# Patient Record
Sex: MALE | Race: WHITE
[De-identification: names, ages, dates, MRNs, and addresses within clinical notes are randomized per-mention and may not be internally consistent; named-entity substitution may affect disease eponyms.]

---

## 2021-01-15 ENCOUNTER — NURSE TRIAGE (OUTPATIENT)
Dept: OTHER | Facility: CLINIC | Age: 81
End: 2021-01-15

## 2021-01-15 NOTE — TELEPHONE ENCOUNTER
Reason for Disposition   [1] CLOSE CONTACT COVID-19 EXPOSURE within last 14 days AND [2] NO symptoms    Answer Assessment - Initial Assessment Questions  1. COVID-19 CLOSE CONTACT: \"Who is the person with the confirmed or suspected COVID-19 infection that you were exposed to? \"      He was exposed to his wife who tested positive on 1/14. 2. PLACE of CONTACT: \"Where were you when you were exposed to COVID-19? \" (e.g., home, school, medical waiting room; which city?)      In the home     3. TYPE of CONTACT: \"How much contact was there? \" (e.g., sitting next to, live in same house, work in same office, same building)     Lives in the same home    4. DURATION of CONTACT: \"How long were you in contact with the COVID-19 patient? \" (e.g., a few seconds, passed by person, a few minutes, 15 minutes or longer, live with the patient)     Lives with the patient     5. MASK: \"Were you wearing a mask? \" \"Was the other person wearing a mask? \" Note: wearing a mask reduces the risk of an   otherwise close contact. No     6. DATE of CONTACT: \"When did you have contact with a COVID-19 patient? \" (e.g., how many days ago)     Every day     7. COMMUNITY SPREAD: \"Are there lots of cases of COVID-19 (community spread) where you live? \" (See public health department website, if unsure)         8. SYMPTOMS: \"Do you have any symptoms? \" (e.g., fever, cough, breathing difficulty, loss of taste or smell)      No symptoms     9. PREGNANCY OR POSTPARTUM: \"Is there any chance you are pregnant? \" \"When was your last menstrual period? \" \"Did you deliver in the last 2 weeks? \"          10. HIGH RISK: \"Do you have any heart or lung problems? Do you have a weak immune system? \" (e.g., heart failure, COPD, asthma, HIV positive, chemotherapy, renal failure, diabetes mellitus, sickle cell anemia, obesity)        No high risk     11. TRAVEL: \"Have you traveled out of the country recently? \" If so, \"When and where? \"  Also ask about out-of-state travel, since the CDC has identified some high-risk cities for community spread in the [address]. Note: Travel becomes less relevant if there is widespread community transmission where the patient lives. Protocols used: CORONAVIRUS (COVID-19) EXPOSURE-ADULT-AH    POD 5     Brief description of triage: Exposure to covid and no symptoms. Wife is caller and asking about if he should be tested with having no symptoms. He is currently out walking. Triage indicates for patient to call PCP to discuss testing. Care advice provided, wife verbalizes understanding; denies any other questions or concerns; instructed to call back for any new or worsening symptoms. This triage is a result of a call to [address] [place] [address]. Please do not respond to the triage nurse through this encounter. Any subsequent communication should be directly with the patient.

## 2023-09-01 ENCOUNTER — HOSPITAL ENCOUNTER (OUTPATIENT)
Dept: DATA CONVERSION | Facility: HOSPITAL | Age: 83
Discharge: HOME | End: 2023-09-01
Payer: MEDICARE

## 2023-09-01 DIAGNOSIS — R97.20 ELEVATED PROSTATE SPECIFIC ANTIGEN (PSA): ICD-10-CM

## 2023-09-01 DIAGNOSIS — I48.0 PAROXYSMAL ATRIAL FIBRILLATION (MULTI): ICD-10-CM

## 2023-09-01 DIAGNOSIS — E78.00 PURE HYPERCHOLESTEROLEMIA, UNSPECIFIED: ICD-10-CM

## 2023-09-01 DIAGNOSIS — I25.10 ATHEROSCLEROTIC HEART DISEASE OF NATIVE CORONARY ARTERY WITHOUT ANGINA PECTORIS: ICD-10-CM

## 2023-09-01 DIAGNOSIS — I10 ESSENTIAL (PRIMARY) HYPERTENSION: ICD-10-CM

## 2023-09-01 LAB
ALBUMIN SERPL-MCNC: 4 GM/DL (ref 3.5–5)
ALBUMIN/GLOB SERPL: 1.6 RATIO (ref 1.5–3)
ALP BLD-CCNC: 45 U/L (ref 35–125)
ALT SERPL-CCNC: 23 U/L (ref 5–40)
ANION GAP SERPL CALCULATED.3IONS-SCNC: 13 MMOL/L (ref 0–19)
APPEARANCE PLAS: CLEAR
AST SERPL-CCNC: 25 U/L (ref 5–40)
BASOPHILS # BLD AUTO: 0.05 K/UL (ref 0–0.22)
BASOPHILS NFR BLD AUTO: 0.8 % (ref 0–1)
BILIRUB SERPL-MCNC: 0.8 MG/DL (ref 0.1–1.2)
BUN SERPL-MCNC: 22 MG/DL (ref 8–25)
BUN/CREAT SERPL: 24.4 RATIO (ref 8–21)
CALCIUM SERPL-MCNC: 8.9 MG/DL (ref 8.5–10.4)
CHLORIDE SERPL-SCNC: 101 MMOL/L (ref 97–107)
CHOLEST SERPL-MCNC: 121 MG/DL (ref 133–200)
CHOLEST/HDLC SERPL: 2.9 RATIO
CO2 SERPL-SCNC: 22 MMOL/L (ref 24–31)
COLOR SPUN FLD: YELLOW
CREAT SERPL-MCNC: 0.9 MG/DL (ref 0.4–1.6)
DEPRECATED RDW RBC AUTO: 44.6 FL (ref 37–54)
DIFFERENTIAL METHOD BLD: ABNORMAL
EOSINOPHIL # BLD AUTO: 0.16 K/UL (ref 0–0.45)
EOSINOPHIL NFR BLD: 2.7 % (ref 0–3)
ERYTHROCYTE [DISTWIDTH] IN BLOOD BY AUTOMATED COUNT: 13.2 % (ref 11.7–15)
FASTING STATUS PATIENT QL REPORTED: ABNORMAL
GFR SERPL CREATININE-BSD FRML MDRD: 86 ML/MIN/1.73 M2
GLOBULIN SER-MCNC: 2.5 G/DL (ref 1.9–3.7)
GLUCOSE SERPL-MCNC: 192 MG/DL (ref 65–99)
HCT VFR BLD AUTO: 45.7 % (ref 41–50)
HDLC SERPL-MCNC: 42 MG/DL
HGB BLD-MCNC: 15.8 GM/DL (ref 13.5–16.5)
IMM GRANULOCYTES # BLD AUTO: 0.01 K/UL (ref 0–0.1)
LDLC SERPL CALC-MCNC: 49 MG/DL (ref 65–130)
LYMPHOCYTES # BLD AUTO: 2.38 K/UL (ref 1.2–3.2)
LYMPHOCYTES NFR BLD MANUAL: 40.1 % (ref 20–40)
MCH RBC QN AUTO: 31.8 PG (ref 26–34)
MCHC RBC AUTO-ENTMCNC: 34.6 % (ref 31–37)
MCV RBC AUTO: 92 FL (ref 80–100)
MONOCYTES # BLD AUTO: 0.43 K/UL (ref 0–0.8)
MONOCYTES NFR BLD MANUAL: 7.3 % (ref 0–8)
NEUTROPHILS # BLD AUTO: 2.9 K/UL
NEUTROPHILS # BLD AUTO: 2.9 K/UL (ref 1.8–7.7)
NEUTROPHILS.IMMATURE NFR BLD: 0.2 % (ref 0–1)
NEUTS SEG NFR BLD: 48.9 % (ref 50–70)
NRBC BLD-RTO: 0 /100 WBC
PLATELET # BLD AUTO: 144 K/UL (ref 150–450)
PMV BLD AUTO: 10 CU (ref 7–12.6)
POTASSIUM SERPL-SCNC: 4 MMOL/L (ref 3.4–5.1)
PROT SERPL-MCNC: 6.5 G/DL (ref 5.9–7.9)
PSA SERPL-MCNC: 4.4 NG/ML (ref 0–4.1)
RBC # BLD AUTO: 4.97 M/UL (ref 4.5–5.5)
SODIUM SERPL-SCNC: 136 MMOL/L (ref 133–145)
TRIGL SERPL-MCNC: 152 MG/DL (ref 40–150)
TSH SERPL DL<=0.05 MIU/L-ACNC: 2.91 MIU/L (ref 0.27–4.2)
WBC # BLD AUTO: 5.9 K/UL (ref 4.5–11)

## 2023-10-03 DIAGNOSIS — N32.81 OVERACTIVE BLADDER: Primary | ICD-10-CM

## 2023-10-03 RX ORDER — OXYBUTYNIN CHLORIDE 5 MG/1
5 TABLET ORAL 2 TIMES DAILY
Qty: 180 TABLET | Refills: 3 | Status: SHIPPED | OUTPATIENT
Start: 2023-10-03 | End: 2023-12-29 | Stop reason: HOSPADM

## 2023-10-03 NOTE — TELEPHONE ENCOUNTER
Requested Prescriptions     Pending Prescriptions Disp Refills    oxybutynin (Ditropan) 5 mg tablet [Pharmacy Med Name: Oxybutynin Chloride 5 MG Oral Tablet] 60 tablet 0     Sig: Take 1 tablet (5 mg) by mouth 2 times a day.

## 2023-12-25 ENCOUNTER — HOSPITAL ENCOUNTER (INPATIENT)
Facility: HOSPITAL | Age: 83
LOS: 4 days | Discharge: NURSING, REHAB OR LONG-TERM CARE | DRG: 185 | End: 2023-12-29
Attending: STUDENT IN AN ORGANIZED HEALTH CARE EDUCATION/TRAINING PROGRAM | Admitting: INTERNAL MEDICINE
Payer: MEDICARE

## 2023-12-25 ENCOUNTER — APPOINTMENT (OUTPATIENT)
Dept: RADIOLOGY | Facility: HOSPITAL | Age: 83
DRG: 185 | End: 2023-12-25
Payer: MEDICARE

## 2023-12-25 DIAGNOSIS — S22.41XA CLOSED FRACTURE OF MULTIPLE RIBS OF RIGHT SIDE, INITIAL ENCOUNTER: Primary | ICD-10-CM

## 2023-12-25 DIAGNOSIS — S22.49XA MULTIPLE RIB FRACTURES INVOLVING FIRST RIB: ICD-10-CM
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
## 2023-12-25 LAB
ALBUMIN SERPL-MCNC: 4.1 G/DL (ref 3.5–5)
ALP BLD-CCNC: 46 U/L (ref 35–125)
ALT SERPL-CCNC: 25 U/L (ref 5–40)
ANION GAP SERPL CALC-SCNC: 10 MMOL/L
AST SERPL-CCNC: 24 U/L (ref 5–40)
BASOPHILS # BLD AUTO: 0.03 X10*3/UL (ref 0–0.1)
BASOPHILS NFR BLD AUTO: 0.3 %
BILIRUB SERPL-MCNC: 0.7 MG/DL (ref 0.1–1.2)
BUN SERPL-MCNC: 28 MG/DL (ref 8–25)
CALCIUM SERPL-MCNC: 8.9 MG/DL (ref 8.5–10.4)
CHLORIDE SERPL-SCNC: 105 MMOL/L (ref 97–107)
CO2 SERPL-SCNC: 23 MMOL/L (ref 24–31)
CREAT SERPL-MCNC: 0.8 MG/DL (ref 0.4–1.6)
EOSINOPHIL # BLD AUTO: 0.02 X10*3/UL (ref 0–0.4)
EOSINOPHIL NFR BLD AUTO: 0.2 %
ERYTHROCYTE [DISTWIDTH] IN BLOOD BY AUTOMATED COUNT: 12.5 % (ref 11.5–14.5)
GFR SERPL CREATININE-BSD FRML MDRD: 88 ML/MIN/1.73M*2
GLUCOSE SERPL-MCNC: 168 MG/DL (ref 65–99)
HCT VFR BLD AUTO: 42.9 % (ref 41–52)
HGB BLD-MCNC: 14.9 G/DL (ref 13.5–17.5)
IMM GRANULOCYTES # BLD AUTO: 0.03 X10*3/UL (ref 0–0.5)
IMM GRANULOCYTES NFR BLD AUTO: 0.3 % (ref 0–0.9)
LYMPHOCYTES # BLD AUTO: 1.31 X10*3/UL (ref 0.8–3)
LYMPHOCYTES NFR BLD AUTO: 12.3 %
MCH RBC QN AUTO: 31.7 PG (ref 26–34)
MCHC RBC AUTO-ENTMCNC: 34.7 G/DL (ref 32–36)
MCV RBC AUTO: 91 FL (ref 80–100)
MONOCYTES # BLD AUTO: 0.48 X10*3/UL (ref 0.05–0.8)
MONOCYTES NFR BLD AUTO: 4.5 %
NEUTROPHILS # BLD AUTO: 8.78 X10*3/UL (ref 1.6–5.5)
NEUTROPHILS NFR BLD AUTO: 82.4 %
NRBC BLD-RTO: 0 /100 WBCS (ref 0–0)
PLATELET # BLD AUTO: 140 X10*3/UL (ref 150–450)
POTASSIUM SERPL-SCNC: 4 MMOL/L (ref 3.4–5.1)
PROT SERPL-MCNC: 6.7 G/DL (ref 5.9–7.9)
RBC # BLD AUTO: 4.7 X10*6/UL (ref 4.5–5.9)
SODIUM SERPL-SCNC: 138 MMOL/L (ref 133–145)
WBC # BLD AUTO: 10.7 X10*3/UL (ref 4.4–11.3)

## 2023-12-25 PROCEDURE — 2500000004 HC RX 250 GENERAL PHARMACY W/ HCPCS (ALT 636 FOR OP/ED): Performed by: STUDENT IN AN ORGANIZED HEALTH CARE EDUCATION/TRAINING PROGRAM

## 2023-12-25 PROCEDURE — 36415 COLL VENOUS BLD VENIPUNCTURE: CPT | Performed by: STUDENT IN AN ORGANIZED HEALTH CARE EDUCATION/TRAINING PROGRAM

## 2023-12-25 PROCEDURE — 85025 COMPLETE CBC W/AUTO DIFF WBC: CPT | Performed by: STUDENT IN AN ORGANIZED HEALTH CARE EDUCATION/TRAINING PROGRAM

## 2023-12-25 PROCEDURE — 2500000001 HC RX 250 WO HCPCS SELF ADMINISTERED DRUGS (ALT 637 FOR MEDICARE OP): Performed by: INTERNAL MEDICINE

## 2023-12-25 PROCEDURE — 71250 CT THORAX DX C-: CPT

## 2023-12-25 PROCEDURE — 2500000001 HC RX 250 WO HCPCS SELF ADMINISTERED DRUGS (ALT 637 FOR MEDICARE OP): Performed by: NURSE PRACTITIONER

## 2023-12-25 PROCEDURE — 2500000004 HC RX 250 GENERAL PHARMACY W/ HCPCS (ALT 636 FOR OP/ED): Performed by: INTERNAL MEDICINE

## 2023-12-25 PROCEDURE — 99285 EMERGENCY DEPT VISIT HI MDM: CPT | Performed by: STUDENT IN AN ORGANIZED HEALTH CARE EDUCATION/TRAINING PROGRAM

## 2023-12-25 PROCEDURE — 99223 1ST HOSP IP/OBS HIGH 75: CPT | Performed by: SURGERY

## 2023-12-25 PROCEDURE — 80053 COMPREHEN METABOLIC PANEL: CPT | Performed by: STUDENT IN AN ORGANIZED HEALTH CARE EDUCATION/TRAINING PROGRAM

## 2023-12-25 PROCEDURE — 97162 PT EVAL MOD COMPLEX 30 MIN: CPT | Mod: GP | Performed by: PHYSICAL THERAPIST

## 2023-12-25 PROCEDURE — 97530 THERAPEUTIC ACTIVITIES: CPT | Mod: GP | Performed by: PHYSICAL THERAPIST

## 2023-12-25 PROCEDURE — 1100000001 HC PRIVATE ROOM DAILY

## 2023-12-25 RX ORDER — AMLODIPINE BESYLATE 2.5 MG/1
2.5 TABLET ORAL EVERY 24 HOURS
COMMUNITY
Start: 2023-03-14

## 2023-12-25 RX ORDER — ROSUVASTATIN CALCIUM 10 MG/1
10 TABLET, FILM COATED ORAL EVERY 24 HOURS
COMMUNITY
Start: 2022-06-28

## 2023-12-25 RX ORDER — LOSARTAN POTASSIUM 100 MG/1
100 TABLET ORAL EVERY 24 HOURS
Status: DISCONTINUED | OUTPATIENT
Start: 2023-12-25 | End: 2023-12-29 | Stop reason: HOSPADM

## 2023-12-25 RX ORDER — OXYCODONE AND ACETAMINOPHEN 5; 325 MG/1; MG/1
1 TABLET ORAL EVERY 6 HOURS PRN
Status: DISCONTINUED | OUTPATIENT
Start: 2023-12-25 | End: 2023-12-29 | Stop reason: HOSPADM

## 2023-12-25 RX ORDER — POLYETHYLENE GLYCOL 3350 17 G/17G
17 POWDER, FOR SOLUTION ORAL DAILY
Status: DISCONTINUED | OUTPATIENT
Start: 2023-12-25 | End: 2023-12-29 | Stop reason: HOSPADM

## 2023-12-25 RX ORDER — METOPROLOL SUCCINATE 25 MG/1
25 TABLET, EXTENDED RELEASE ORAL DAILY
COMMUNITY
Start: 2023-08-30

## 2023-12-25 RX ORDER — ROSUVASTATIN CALCIUM 10 MG/1
10 TABLET, COATED ORAL NIGHTLY
Status: DISCONTINUED | OUTPATIENT
Start: 2023-12-25 | End: 2023-12-29 | Stop reason: HOSPADM

## 2023-12-25 RX ORDER — BACLOFEN 10 MG/1
10 TABLET ORAL 3 TIMES DAILY
Status: DISCONTINUED | OUTPATIENT
Start: 2023-12-25 | End: 2023-12-29 | Stop reason: HOSPADM

## 2023-12-25 RX ORDER — LOSARTAN POTASSIUM 100 MG/1
100 TABLET ORAL EVERY 24 HOURS
COMMUNITY
Start: 2022-12-12

## 2023-12-25 RX ORDER — SILDENAFIL CITRATE 20 MG/1
20 TABLET ORAL AS NEEDED
COMMUNITY
Start: 2015-10-23 | End: 2023-12-29 | Stop reason: HOSPADM

## 2023-12-25 RX ORDER — OXYCODONE AND ACETAMINOPHEN 5; 325 MG/1; MG/1
2 TABLET ORAL EVERY 6 HOURS PRN
Status: DISCONTINUED | OUTPATIENT
Start: 2023-12-25 | End: 2023-12-25

## 2023-12-25 RX ORDER — MORPHINE SULFATE 4 MG/ML
4 INJECTION, SOLUTION INTRAMUSCULAR; INTRAVENOUS ONCE
Status: COMPLETED | OUTPATIENT
Start: 2023-12-25 | End: 2023-12-25

## 2023-12-25 RX ORDER — OXYCODONE AND ACETAMINOPHEN 5; 325 MG/1; MG/1
2 TABLET ORAL EVERY 4 HOURS PRN
Status: DISCONTINUED | OUTPATIENT
Start: 2023-12-25 | End: 2023-12-29 | Stop reason: HOSPADM

## 2023-12-25 RX ORDER — PETROLATUM,WHITE/LANOLIN
1000 OINTMENT (GRAM) TOPICAL EVERY 24 HOURS
COMMUNITY
End: 2023-12-29 | Stop reason: HOSPADM

## 2023-12-25 RX ORDER — ASPIRIN 81 MG/1
81 TABLET ORAL DAILY
COMMUNITY

## 2023-12-25 RX ORDER — APIXABAN 5 MG/1
5 TABLET, FILM COATED ORAL EVERY 12 HOURS
COMMUNITY

## 2023-12-25 RX ORDER — METOPROLOL SUCCINATE 25 MG/1
25 TABLET, EXTENDED RELEASE ORAL DAILY
Status: DISCONTINUED | OUTPATIENT
Start: 2023-12-25 | End: 2023-12-29 | Stop reason: HOSPADM

## 2023-12-25 RX ORDER — ASPIRIN 81 MG/1
81 TABLET ORAL DAILY
Status: DISCONTINUED | OUTPATIENT
Start: 2023-12-25 | End: 2023-12-29 | Stop reason: HOSPADM

## 2023-12-25 RX ORDER — AMLODIPINE BESYLATE 2.5 MG/1
2.5 TABLET ORAL EVERY 24 HOURS
Status: DISCONTINUED | OUTPATIENT
Start: 2023-12-25 | End: 2023-12-29 | Stop reason: HOSPADM

## 2023-12-25 RX ORDER — OXYCODONE HCL 10 MG/1
10 TABLET, FILM COATED, EXTENDED RELEASE ORAL EVERY 12 HOURS SCHEDULED
Status: DISCONTINUED | OUTPATIENT
Start: 2023-12-25 | End: 2023-12-29 | Stop reason: HOSPADM

## 2023-12-25 RX ADMIN — OXYCODONE AND ACETAMINOPHEN 2 TABLET: 5; 325 TABLET ORAL at 14:01

## 2023-12-25 RX ADMIN — APIXABAN 5 MG: 5 TABLET, FILM COATED ORAL at 20:17

## 2023-12-25 RX ADMIN — POLYETHYLENE GLYCOL 3350 17 G: 17 POWDER, FOR SOLUTION ORAL at 09:43

## 2023-12-25 RX ADMIN — OXYCODONE AND ACETAMINOPHEN 2 TABLET: 5; 325 TABLET ORAL at 18:44

## 2023-12-25 RX ADMIN — METOPROLOL SUCCINATE 25 MG: 25 TABLET, EXTENDED RELEASE ORAL at 09:42

## 2023-12-25 RX ADMIN — ASPIRIN 81 MG: 81 TABLET, COATED ORAL at 09:43

## 2023-12-25 RX ADMIN — OXYCODONE AND ACETAMINOPHEN 2 TABLET: 5; 325 TABLET ORAL at 05:44

## 2023-12-25 RX ADMIN — OXYCODONE HYDROCHLORIDE 10 MG: 10 TABLET, FILM COATED, EXTENDED RELEASE ORAL at 20:17

## 2023-12-25 RX ADMIN — MORPHINE SULFATE 4 MG: 4 INJECTION, SOLUTION INTRAMUSCULAR; INTRAVENOUS at 02:38

## 2023-12-25 RX ADMIN — ROSUVASTATIN CALCIUM 10 MG: 10 TABLET, FILM COATED ORAL at 20:17

## 2023-12-25 RX ADMIN — OXYCODONE HYDROCHLORIDE 10 MG: 10 TABLET, FILM COATED, EXTENDED RELEASE ORAL at 09:43

## 2023-12-25 RX ADMIN — BACLOFEN 10 MG: 10 TABLET ORAL at 20:17

## 2023-12-25 RX ADMIN — APIXABAN 5 MG: 5 TABLET, FILM COATED ORAL at 09:42

## 2023-12-25 SDOH — SOCIAL STABILITY: SOCIAL INSECURITY: DOES ANYONE TRY TO KEEP YOU FROM HAVING/CONTACTING OTHER FRIENDS OR DOING THINGS OUTSIDE YOUR HOME?: NO

## 2023-12-25 SDOH — SOCIAL STABILITY: SOCIAL INSECURITY: ARE YOU OR HAVE YOU BEEN THREATENED OR ABUSED PHYSICALLY, EMOTIONALLY, OR SEXUALLY BY ANYONE?: NO

## 2023-12-25 SDOH — SOCIAL STABILITY: SOCIAL INSECURITY: ABUSE: ADULT

## 2023-12-25 SDOH — SOCIAL STABILITY: SOCIAL INSECURITY: DO YOU FEEL ANYONE HAS EXPLOITED OR TAKEN ADVANTAGE OF YOU FINANCIALLY OR OF YOUR PERSONAL PROPERTY?: NO

## 2023-12-25 SDOH — SOCIAL STABILITY: SOCIAL INSECURITY: WERE YOU ABLE TO COMPLETE ALL THE BEHAVIORAL HEALTH SCREENINGS?: YES

## 2023-12-25 SDOH — SOCIAL STABILITY: SOCIAL INSECURITY: DO YOU FEEL UNSAFE GOING BACK TO THE PLACE WHERE YOU ARE LIVING?: NO

## 2023-12-25 SDOH — SOCIAL STABILITY: SOCIAL INSECURITY: ARE THERE ANY APPARENT SIGNS OF INJURIES/BEHAVIORS THAT COULD BE RELATED TO ABUSE/NEGLECT?: NO

## 2023-12-25 SDOH — SOCIAL STABILITY: SOCIAL INSECURITY: HAS ANYONE EVER THREATENED TO HURT YOUR FAMILY OR YOUR PETS?: NO

## 2023-12-25 SDOH — SOCIAL STABILITY: SOCIAL INSECURITY: HAVE YOU HAD THOUGHTS OF HARMING ANYONE ELSE?: NO

## 2023-12-25 ASSESSMENT — COGNITIVE AND FUNCTIONAL STATUS - GENERAL
DAILY ACTIVITIY SCORE: 15
MOVING FROM LYING ON BACK TO SITTING ON SIDE OF FLAT BED WITH BEDRAILS: A LITTLE
HELP NEEDED FOR BATHING: A LITTLE
EATING MEALS: A LITTLE
TURNING FROM BACK TO SIDE WHILE IN FLAT BAD: A LOT
CLIMB 3 TO 5 STEPS WITH RAILING: A LITTLE
DRESSING REGULAR UPPER BODY CLOTHING: A LITTLE
PATIENT BASELINE BEDBOUND: NO
CLIMB 3 TO 5 STEPS WITH RAILING: A LOT
WALKING IN HOSPITAL ROOM: A LOT
WALKING IN HOSPITAL ROOM: A LITTLE
DAILY ACTIVITIY SCORE: 18
DRESSING REGULAR UPPER BODY CLOTHING: A LITTLE
DRESSING REGULAR LOWER BODY CLOTHING: A LITTLE
PERSONAL GROOMING: A LITTLE
PERSONAL GROOMING: A LITTLE
DRESSING REGULAR LOWER BODY CLOTHING: A LITTLE
WALKING IN HOSPITAL ROOM: A LOT
TURNING FROM BACK TO SIDE WHILE IN FLAT BAD: A LITTLE
STANDING UP FROM CHAIR USING ARMS: A LITTLE
PERSONAL GROOMING: A LOT
WALKING IN HOSPITAL ROOM: A LOT
TOILETING: A LITTLE
MOVING FROM LYING ON BACK TO SITTING ON SIDE OF FLAT BED WITH BEDRAILS: A LITTLE
MOBILITY SCORE: 13
HELP NEEDED FOR BATHING: A LITTLE
CLIMB 3 TO 5 STEPS WITH RAILING: A LOT
TURNING FROM BACK TO SIDE WHILE IN FLAT BAD: A LOT
MOVING TO AND FROM BED TO CHAIR: A LOT
MOVING FROM LYING ON BACK TO SITTING ON SIDE OF FLAT BED WITH BEDRAILS: A LITTLE
STANDING UP FROM CHAIR USING ARMS: A LOT
HELP NEEDED FOR BATHING: A LOT
DAILY ACTIVITIY SCORE: 18
TURNING FROM BACK TO SIDE WHILE IN FLAT BAD: A LOT
TOILETING: A LOT
MOVING TO AND FROM BED TO CHAIR: A LOT
TOILETING: A LITTLE
MOBILITY SCORE: 18
STANDING UP FROM CHAIR USING ARMS: A LOT
EATING MEALS: A LITTLE
DRESSING REGULAR UPPER BODY CLOTHING: A LITTLE
MOVING TO AND FROM BED TO CHAIR: A LITTLE
STANDING UP FROM CHAIR USING ARMS: A LOT
MOVING FROM LYING ON BACK TO SITTING ON SIDE OF FLAT BED WITH BEDRAILS: A LITTLE
EATING MEALS: A LITTLE
DRESSING REGULAR LOWER BODY CLOTHING: A LITTLE
MOBILITY SCORE: 13
CLIMB 3 TO 5 STEPS WITH RAILING: A LOT
MOBILITY SCORE: 13
MOVING TO AND FROM BED TO CHAIR: A LOT

## 2023-12-25 ASSESSMENT — PATIENT HEALTH QUESTIONNAIRE - PHQ9
SUM OF ALL RESPONSES TO PHQ9 QUESTIONS 1 & 2: 0
1. LITTLE INTEREST OR PLEASURE IN DOING THINGS: NOT AT ALL
2. FEELING DOWN, DEPRESSED OR HOPELESS: NOT AT ALL

## 2023-12-25 ASSESSMENT — LIFESTYLE VARIABLES
HOW OFTEN DO YOU HAVE A DRINK CONTAINING ALCOHOL: NEVER
HOW OFTEN DO YOU HAVE 6 OR MORE DRINKS ON ONE OCCASION: NEVER
AUDIT-C TOTAL SCORE: 0
AUDIT-C TOTAL SCORE: 0
HOW MANY STANDARD DRINKS CONTAINING ALCOHOL DO YOU HAVE ON A TYPICAL DAY: PATIENT DOES NOT DRINK
HAVE YOU EVER FELT YOU SHOULD CUT DOWN ON YOUR DRINKING: NO
HAVE PEOPLE ANNOYED YOU BY CRITICIZING YOUR DRINKING: NO
EVER HAD A DRINK FIRST THING IN THE MORNING TO STEADY YOUR NERVES TO GET RID OF A HANGOVER: NO
SKIP TO QUESTIONS 9-10: 1
REASON UNABLE TO ASSESS: NO
EVER FELT BAD OR GUILTY ABOUT YOUR DRINKING: NO

## 2023-12-25 ASSESSMENT — ACTIVITIES OF DAILY LIVING (ADL)
PATIENT'S MEMORY ADEQUATE TO SAFELY COMPLETE DAILY ACTIVITIES?: YES
ADEQUATE_TO_COMPLETE_ADL: YES
WALKS IN HOME: INDEPENDENT
DRESSING YOURSELF: INDEPENDENT
HEARING - RIGHT EAR: FUNCTIONAL
BATHING: INDEPENDENT
HEARING - LEFT EAR: FUNCTIONAL
TOILETING: INDEPENDENT
LACK_OF_TRANSPORTATION: NO
GROOMING: INDEPENDENT
ADL_ASSISTANCE: INDEPENDENT
JUDGMENT_ADEQUATE_SAFELY_COMPLETE_DAILY_ACTIVITIES: YES
FEEDING YOURSELF: INDEPENDENT

## 2023-12-25 ASSESSMENT — PAIN - FUNCTIONAL ASSESSMENT
PAIN_FUNCTIONAL_ASSESSMENT: 0-10

## 2023-12-25 ASSESSMENT — PAIN SCALES - GENERAL
PAINLEVEL_OUTOF10: 9
PAINLEVEL_OUTOF10: 5 - MODERATE PAIN
PAINLEVEL_OUTOF10: 6
PAINLEVEL_OUTOF10: 7
PAINLEVEL_OUTOF10: 10 - WORST POSSIBLE PAIN
PAINLEVEL_OUTOF10: 2
PAINLEVEL_OUTOF10: 2

## 2023-12-25 ASSESSMENT — PAIN DESCRIPTION - PROGRESSION: CLINICAL_PROGRESSION: GRADUALLY IMPROVING

## 2023-12-25 ASSESSMENT — COLUMBIA-SUICIDE SEVERITY RATING SCALE - C-SSRS
1. IN THE PAST MONTH, HAVE YOU WISHED YOU WERE DEAD OR WISHED YOU COULD GO TO SLEEP AND NOT WAKE UP?: NO
2. HAVE YOU ACTUALLY HAD ANY THOUGHTS OF KILLING YOURSELF?: NO
6. HAVE YOU EVER DONE ANYTHING, STARTED TO DO ANYTHING, OR PREPARED TO DO ANYTHING TO END YOUR LIFE?: NO

## 2023-12-25 ASSESSMENT — PAIN DESCRIPTION - DESCRIPTORS: DESCRIPTORS: JABBING

## 2023-12-25 ASSESSMENT — PAIN DESCRIPTION - ORIENTATION
ORIENTATION: RIGHT;LEFT
ORIENTATION: RIGHT

## 2023-12-25 ASSESSMENT — PAIN DESCRIPTION - LOCATION: LOCATION: CHEST

## 2023-12-25 NOTE — PROGRESS NOTES
"Skip Selby is a 83 y.o. male on day 0 of admission presenting with Multiple rib fractures involving first rib.    Subjective   HPI   Patient seen and examined, complains with ribs pain especially with change of position.  Patient was not able to work with PT today.   Patient denies any  shortness of breath in room air.  Patient tolerates well her diet with no nausea vomiting or abdominal pain.  No fever or chills.  No headache or dizziness.      Objective     Last Recorded Vitals  Blood pressure 161/85, pulse 84, temperature 36.6 °C (97.9 °F), temperature source Oral, resp. rate 20, height 1.727 m (5' 8\"), weight 91.4 kg (201 lb 8 oz), SpO2 96 %.    No intake or output data in the 24 hours ending 12/25/23 0855      Physical Exam  Constitutional:       Appearance: Normal appearance.   HENT:      Nose: Nose normal.      Mouth/Throat:      Mouth: Mucous membranes are moist.   Eyes:      Extraocular Movements: Extraocular movements intact.      Pupils: Pupils are equal, round, and reactive to light.   Cardiovascular:      Rate and Rhythm: Normal rate and regular rhythm.   Pulmonary:      Effort: Pulmonary effort is normal.      Breath sounds: Normal breath sounds.   Abdominal:      General: Bowel sounds are normal.      Palpations: Abdomen is soft.   Musculoskeletal:         General: Tenderness present.      Cervical back: Normal range of motion and neck supple.      Comments: Ribs pain due to fall    Skin:     General: Skin is warm.   Neurological:      General: No focal deficit present.      Mental Status: He is alert and oriented to person, place, and time.   Psychiatric:         Mood and Affect: Mood normal.        Relevant Results    Lab Results   Component Value Date    WBC 10.7 12/25/2023    HGB 14.9 12/25/2023    HCT 42.9 12/25/2023    MCV 91 12/25/2023     (L) 12/25/2023       Lab Results   Component Value Date    GLUCOSE 168 (H) 12/25/2023    CALCIUM 8.9 12/25/2023     12/25/2023    K 4.0 " 12/25/2023    CO2 23 (L) 12/25/2023     12/25/2023    BUN 28 (H) 12/25/2023    CREATININE 0.80 12/25/2023       Lab Results   Component Value Date    HGBA1C 5.5 08/27/2021       No results found.  Scheduled medications  amLODIPine, 2.5 mg, oral, q24h  apixaban, 5 mg, oral, q12h  aspirin, 81 mg, oral, Daily  losartan, 100 mg, oral, q24h  metoprolol succinate XL, 25 mg, oral, Daily  oxyCODONE ER, 10 mg, oral, q12h HEATHER  polyethylene glycol, 17 g, oral, Daily  rosuvastatin, 10 mg, oral, Nightly      Continuous medications     PRN medications  PRN medications: oxyCODONE-acetaminophen, oxyCODONE-acetaminophen    Assessment/Plan   Principal Problem:  Multiple rib fractures involving first rib  CT chest shows multiple right-sided rib fractures.  No pneumothorax no pleural effusion   Keep pain under control   PT/OT     Coronary artery disease, Hyperlipidemia,   No chest pain or shortness of breath  Continue with same home meds statin , aspirin Eliquis and beta-blocker  On Tele     Hypertension   BP is slightly elevated   Continue with the same regiment    DVT prophylaxis   On heparin     Discharge plan:  Anticipate discharging patient home with home health care vs skilled of nursing when medically clear    I spent 25 minutes in the professional and overall care of this patient.    Eufemia Marti, APRN-CNP

## 2023-12-25 NOTE — NURSING NOTE
Admitted an 83 year old male patient came with history of fall, not in any distress, oriented to room and hill-rom system, kept resting comfortably with call light in reach.

## 2023-12-25 NOTE — PROGRESS NOTES
Physical Therapy    Physical Therapy Evaluation    Patient Name: Skip Selby  MRN: [id number]  Today's Date: 12/25/2023   Time Calculation  Start Time: 0736  Stop Time: 0759  Time Calculation (min): 23 min    Assessment/Plan   PT Assessment  PT Assessment Results: Decreased strength, Decreased range of motion, Decreased endurance, Impaired balance, Decreased mobility, Obesity, Pain  Rehab Prognosis: Good  Barriers to Discharge: Pain  Evaluation/Treatment Tolerance: Patient limited by pain  Medical Staff Made Aware: Yes  Strengths: Ability to acquire knowledge, Insight into problems, Leisure activity, Support of Caregivers  Barriers to Participation: Comorbidities  End of Session Communication: Bedside nurse  Assessment Comment: Pt is a 83 y.o. male presenting following a fall with high pain levels due to multiple right sided rib fractures. Pt with decreased strength, poor endurance, decreased functional mobility, gait deficits, impaired balance, and is at risk for falls. Pt would benefit from moderate intensity needs at discharge.  End of Session Patient Position: Bed, 2 rail up  IP OR SWING BED PT PLAN  Inpatient or Swing Bed: Inpatient  PT Plan  Treatment/Interventions: Bed mobility, Transfer training, Gait training, Stair training, Neuromuscular re-education, Strengthening, Endurance training, Range of motion, Therapeutic exercise, Therapeutic activity, Home exercise program, Positioning, Postural re-education  PT Plan: Skilled PT  PT Frequency: 3 times per week  PT Discharge Recommendations: Moderate intensity level of continued care  Equipment Recommended upon Discharge: Straight cane, Wheeled walker  PT Recommended Transfer Status: Assist x1, Assistive device      Subjective   General Visit Information:  General  Reason for Referral: weakness  Referred By: Dr. Ochoa  Past Medical History Relevant to Rehab: CAD, HTN, Hyperlipidemia  Missed Visit: No  Prior to Session Communication: Bedside nurse  Patient  Position Received: Bed, 2 rail up  Preferred Learning Style: verbal  General Comment: Pt cleared for therapy by nursing. Supine in bed upon arrival. Agreeable to PT session.  Home Living:  Home Living  Type of Home: House  Lives With: Spouse  Home Adaptive Equipment: None  Home Layout: Two level, Bed/bath upstairs, Stairs to alternate level with rails  Home Access: Stairs to enter without rails  Entrance Stairs-Number of Steps: 3  Bathroom Shower/Tub: Walk-in tub, Walk-in shower  Bathroom Toilet: Standard  Bathroom Equipment: None  Prior Level of Function:  Prior Function Per Pt/Caregiver Report  Level of [place]: Independent with homemaking with ambulation, Independent with ADLs and functional transfers  ADL Assistance: Independent  Homemaking Assistance: Independent  Ambulatory Assistance: Independent  Vocational: Retired  Leisure:  (Hiking)  Precautions:  Precautions  Medical Precautions: Fall precautions  Vital Signs:  See flowsheet    Objective   Pain:  Pain Assessment  Pain Assessment: 0-10  Pain Score: 5 - Moderate pain  Pain Type: Acute pain  Pain Location: Rib cage (Right)  Pain Orientation: Right  Pain Descriptors: Jabbing  Clinical Progression: Not changed  Patient's Stated Pain Goal: No pain  Pain Interventions: Repositioned  Cognition:  Cognition  Overall Cognitive Status: Within Functional Limits  Insight: Within function limits    General Assessments:  General Observation  General Observation:  (Catheter)         Activity Tolerance  Endurance: Tolerates less than 10 min exercise, no significant change in vital signs, Decreased tolerance for upright activites  Early Mobility/Exercise Safety Screen: Proceed with mobilization - No exclusion criteria met    Sensation  Light Touch: No apparent deficits    Coordination  Movements are Fluid and Coordinated: Yes    Postural Control  Posture Comment: Moderate forward head, leans to left to offweight right UE/LE    Static Sitting Balance  Static  Sitting-Balance Support: Bilateral upper extremity supported  Static Sitting-Level of Assistance: Close supervision  Static Sitting-Comment/Number of Minutes: 2  Functional Assessments:       Bed Mobility  Bed Mobility: Yes  Bed Mobility 1  Bed Mobility 1: Supine to sitting  Level of Assistance 1: Moderate assistance, Moderate verbal cues  Bed Mobility Comments 1:  (Performs supine to sit at mod A. Assist for lowering bilat LEs over EOB. Then assist and cues required for upright trunk positioning. Pt instructed to perform logroll to left and reach for bed rails. Then instructed to push through elbows.)  Bed Mobility 2  Bed Mobility  2: Sitting to supine  Level of Assistance 2: Moderate assistance, Moderate verbal cues  Bed Mobility Comments 2:  (Mod A for bilat LE positioning and trunk return to supine)    Transfers  Transfer: No    Ambulation/Gait Training  Ambulation/Gait Training Performed: No    Stairs  Stairs: No  Extremity/Trunk Assessments:  RLE   RLE : Within Functional Limits  LLE   LLE : Within Functional Limits  Outcome Measures:  [facility] Basic Mobility  Turning from your back to your side while in a flat bed without using bedrails: A little  Moving from lying on your back to sitting on the side of a flat bed without using bedrails: A lot  Moving to and from bed to chair (including a wheelchair): A lot  Standing up from a chair using your arms (e.g. wheelchair or bedside chair): A lot  To walk in hospital room: A lot  Climbing 3-5 steps with railing: A lot  Basic Mobility - Total Score: 13    Encounter Problems       Encounter Problems (Active)       Mobility       STG - Patient will ambulate 50 feet with modified independent assist       Start:  12/25/23    Expected End:  01/08/24               Pain          Transfers       STG - Patient to transfer to and from sit to supine with modified independent        Start:  12/25/23    Expected End:  01/08/24            STG - Patient will transfer sit to and from  stand with modified independent       Start:  12/25/23    Expected End:  01/08/24

## 2023-12-25 NOTE — LETTER
"December 28, 2023       No Recipients    Patient: Skip Selby   YOB: 1940   Date of Visit: 12/25/2023       Dear Dr. Pratt Recipients:    Thank you for referring Skip Selby to me for evaluation. Below are my notes for this consultation.  If you have questions, please do not hesitate to call me. I look forward to following your patient along with you.       Sincerely,     No name on file      CC:   No Recipients  ______________________________________________________________________________________    Skip Selby is a 83 y.o. male on day 3 of admission presenting with Multiple rib fractures involving first rib.    Subjective  HPI   Patient seen and examined, feels better today able to sleep less spasm pt said that he could not work with therapy wants her to come back  in the afternoon.  Patient denies any  shortness of breath in room air.  Patient tolerates well her diet with no nausea vomiting or abdominal pain.  No fever or chills.      Objective    Last Recorded Vitals  Blood pressure 148/62, pulse 72, temperature 36.5 °C (97.7 °F), temperature source Oral, resp. rate 16, height 1.702 m (5' 7\"), weight 94.9 kg (209 lb 3.5 oz), SpO2 96 %.      Intake/Output Summary (Last 24 hours) at 12/28/2023 0812  Last data filed at 12/28/2023 0300  Gross per 24 hour   Intake 890 ml   Output 2000 ml   Net -1110 ml           Physical Exam  Constitutional:       Appearance: Normal appearance.   HENT:      Nose: Nose normal.      Mouth/Throat:      Mouth: Mucous membranes are moist.   Eyes:      Extraocular Movements: Extraocular movements intact.      Pupils: Pupils are equal, round, and reactive to light.   Cardiovascular:      Rate and Rhythm: Normal rate and regular rhythm.   Pulmonary:      Effort: Pulmonary effort is normal.      Breath sounds: Normal breath sounds.   Abdominal:      General: Bowel sounds are normal.      Palpations: Abdomen is soft.   Musculoskeletal:         General: Tenderness " present.      Cervical back: Normal range of motion and neck supple.      Comments: Ribs pain due to fall    Skin:     General: Skin is warm.   Neurological:      General: No focal deficit present.      Mental Status: He is alert and oriented to person, place, and time.   Psychiatric:         Mood and Affect: Mood normal.        Relevant Results    Lab Results   Component Value Date    WBC 9.2 12/27/2023    HGB 15.3 12/27/2023    HCT 44.2 12/27/2023    MCV 92 12/27/2023     (L) 12/27/2023       Lab Results   Component Value Date    GLUCOSE 159 (H) 12/27/2023    CALCIUM 8.7 12/27/2023     12/27/2023    K 3.8 12/27/2023    CO2 23 (L) 12/27/2023     12/27/2023    BUN 25 12/27/2023    CREATININE 0.80 12/27/2023       Lab Results   Component Value Date    HGBA1C 5.5 08/27/2021       No results found.  Scheduled medications  acetaminophen, 650 mg, oral, q6h  amLODIPine, 2.5 mg, oral, q24h  apixaban, 5 mg, oral, q12h  aspirin, 81 mg, oral, Daily  baclofen, 10 mg, oral, TID  lidocaine, 1 patch, transdermal, Daily  losartan, 100 mg, oral, q24h  methocarbamol, 500 mg, oral, q8h HEATHER  metoprolol succinate XL, 25 mg, oral, Daily  oxyCODONE ER, 10 mg, oral, q12h HEATHER  pantoprazole, 40 mg, oral, Daily  polyethylene glycol, 17 g, oral, Daily  rosuvastatin, 10 mg, oral, Nightly      Continuous medications     PRN medications  PRN medications: ipratropium-albuteroL, naloxone, ondansetron, oxyCODONE-acetaminophen, oxyCODONE-acetaminophen    Assessment/Plan  Principal Problem:  Multiple rib fractures involving first rib/ ribs pain   CT chest shows multiple right-sided rib fractures.  No pneumothorax no pleural effusion   Keep pain under control   Keep binder on   PT/OT recommend SNF    Nausea/GERD  And complaining of nausea order Zofran as needed  Start him on pantoprazole f    Coronary artery disease, Hyperlipidemia,   No chest pain or shortness of breath  Continue with same home meds statin , aspirin Eliquis and  beta-blocker  On Tele     Hypertension   BP is slightly elevated   Continue with the same regiment    DVT prophylaxis   On heparin     Discharge plan:  Pt s wife at the bedside answer to all her question and concerns.  She choosed  the nursing home that his insurance can accept     Anticipate discharging patient to skilled of nursing   I spent 25 minutes in the professional and overall care of this patient.    Eufemia Marti, APRN-CNP          Physical Therapy    Physical Therapy Treatment    Patient Name: Skip Selby  MRN: [id number]  Today's Date: 12/27/2023  Time Calculation  Start Time: 1445  Stop Time: 1502  Time Calculation (min): 17 min    Assessment/Plan  PT Assessment  End of Session Communication: Bedside nurse  Assessment Comment: pt requires a significant amount of time to complete all activites. pt is very guarded and requires mod A x 1-2 for safe mobility. pt will benefit form continued skilled therapy services to improve functional performance and maxmimze safety.  End of Session Patient Position: Up in chair, Alarm off, not on at start of session     PT Plan  Treatment/Interventions: Bed mobility, Transfer training, Gait training, Stair training, Neuromuscular re-education, Strengthening, Endurance training, Range of motion, Therapeutic exercise, Therapeutic activity, Home exercise program, Positioning, Postural re-education  PT Plan: Skilled PT  PT Frequency: 4 times per week  PT Discharge Recommendations: Moderate intensity level of continued care  Equipment Recommended upon Discharge: Wheeled walker  PT Recommended Transfer Status: Assist x2  PT - OK to Discharge: Yes    General Visit Information:   PT  Visit  PT Received On: 12/27/23  Response to Previous Treatment: Other (Comment) (reports pain and muscle spasms have decreased)  General  Missed Visit: Yes  Missed Visit Reason: Patient refused (reports he would like another dose of pain medication prior to mobility)  Family/Caregiver Present:  No  Co-Treatment: OT  Co-Treatment Reason: partial co-treat during mobility portion due to pt's low tolerance to pain and safety of caregivers and patient.  Prior to Session Communication: Bedside nurse  Patient Position Received: Bed, 2 rail up, Alarm off, not on at start of session  General Comment: pt agreeable to therapy and mobility at this time.    Subjective  Precautions:  Precautions  Hearing/Visual Limitations: wears glasses  Medical Precautions: Fall precautions    Objective  Pain:  Pain Assessment  Pain Assessment: FLACC (Face, Legs, Activity, Cry, Consolability)  Pain Score: 8  Pain Type: Acute pain  Pain Location: Rib cage  Pain Orientation: Right  Pain Descriptors: Spasm  Clinical Progression: Other (Comment) (intermittent)  Pain Interventions: Repositioned  Response to Interventions: no spasms while pt sitting in chair  Cognition:  Cognition  Overall Cognitive Status: Within Functional Limits  Postural Control:  Postural Control  Posture Comment: pt attempting to keep an erect, upright posture  Activity Tolerance:  Activity Tolerance  Endurance: Decreased tolerance for upright activites  Activity Tolerance Comments: fair-, limited by pain and muscle spasms  Treatments:  Therapeutic Exercise  Therapeutic Exercise Performed:  (deferred due to pain from moving)    Therapeutic Activity  Therapeutic Activity Performed:  (pt able to use arm rests on chair to help scoot his hips back.)    Balance/Neuromuscular Re-Education  Balance/Neuromuscular Re-Education Activity Performed:  (Intermittent CGA for static sitting balance while seated at EOB, B toes touching the floor, R hand in lap, left hand on bed supporting trunk.  min A for static standing balance at the bedside, wide SAGRARIO.)    Bed Mobility  Bed Mobility:  (significantly increased time to complete transfer to sitting on EOB, extensive rest periods between each movement. HOB elevated, strong use of bed rail. pt eventually able to move B LEs off EOB, max  A to elevate trunk.)    Ambulation/Gait Training  Ambulation/Gait Training Performed:  (Amb 6 short, shuffled steps to the chair, wide SAGRARIO, decreased weight shifting, unsteady with mod A x 1-2 for balance support.)    Transfers  Transfer:  (mod A x 1-2 to stand, tactile cues for hip extension, mod A to guide trunk up and establish COG over SAGRARIO.  Increased time needed to sit in chair, verbal cues to reach left hand back for arm of chair, mod A for eccentric control.)    Stairs  Stairs: No    Outcome Measures:  [facility] Basic Mobility  Turning from your back to your side while in a flat bed without using bedrails: Total  Moving from lying on your back to sitting on the side of a flat bed without using bedrails: Total  Moving to and from bed to chair (including a wheelchair): A lot  Standing up from a chair using your arms (e.g. wheelchair or bedside chair): A lot  To walk in hospital room: A lot  Climbing 3-5 steps with railing: Total  Basic Mobility - Total Score: 9    IP EDUCATION:  Individual(s) Educated: Patient  Education Provided: Body Mechanics, Fall Risk, Posture  Risk and Benefits Discussed with Patient/Caregiver/Other: yes  Patient/Caregiver Demonstrated Understanding: yes  Plan of Care Discussed and Agreed Upon: yes  Patient Response to Education: Patient/Caregiver Verbalized Understanding of Information    Encounter Problems       Encounter Problems (Active)       Mobility       STG - Patient will ambulate 50 feet with modified independent assist (Progressing)       Start:  12/25/23    Expected End:  01/08/24               Transfers       STG - Patient to transfer to and from sit to supine with modified independent  (Progressing)       Start:  12/25/23    Expected End:  01/08/24            STG - Patient will transfer sit to and from stand with modified independent (Progressing)       Start:  12/25/23    Expected End:  01/08/24                     Occupational Therapy    Evaluation/Treatment    Patient Name:  Skip Selby  MRN: [id number]  : 1940  Today's Date: 23  Time Calculation  Start Time: 1424  Stop Time: 1459  Time Calculation (min): 35 min       Assessment:  OT Assessment: Pt would benefit from acute OT services  Prognosis: Good  End of Session Communication: Bedside nurse  End of Session Patient Position: Up in chair (all needs in reach, RN aware)  OT Assessment Results: Decreased ADL status, Decreased upper extremity strength, Decreased endurance, Decreased functional mobility  Prognosis: Good  Strengths: Ability to acquire knowledge, Premorbid level of function, Support of extended family/friends  Plan:  Treatment Interventions: ADL retraining, Functional transfer training, Endurance training, Equipment evaluation/education, Patient/family training  OT Frequency: 4 times per week  OT Discharge Recommendations: Moderate intensity level of continued care  OT - OK to Discharge: Yes  Treatment Interventions: ADL retraining, Functional transfer training, Endurance training, Equipment evaluation/education, Patient/family training    Subjective    General:   OT Received On: 23  General  Reason for Referral: Impaired ADLs; pt admitted from home after sustaining a fall resulting in Right rib fx 4-10  Past Medical History Relevant to Rehab: CAD, HTN, Hyperlipidemia  Missed Visit: No  Missed Visit Reason: Other (Comment)  Family/Caregiver Present: No  Co-Treatment: PT  Co-Treatment Reason: partial  Prior to Session Communication: Bedside nurse  Patient Position Received: Bed, 3 rail up, Alarm off, not on at start of session  General Comment: Cleared by nursing. Pt agreeable to therapy with encouragement  Precautions:  Medical Precautions: Fall precautions     Pain:  Pain Assessment  Pain Assessment: 0-10  Pain Score:  (0/10 at rest; FLACC 6 with movement right ribs)    Objective  Cognition:  Overall Cognitive Status: Within Functional Limits           Home Living:  Type of Home: House  Lives With:  Spouse  Home Adaptive Equipment: None  Home Layout: Two level, Bed/bath upstairs, Full bath main level, Able to live on main level with bedroom/bathroom  Home Access: Stairs to enter with rails  Entrance Stairs-Rails:  (unilateral)  Entrance Stairs-Number of Steps: 3  Bathroom Shower/Tub: Walk-in tub, Walk-in shower  Bathroom Toilet: Standard  Bathroom Equipment: None  Prior Function:  Level of [place]: Independent with homemaking with ambulation, Independent with ADLs and functional transfers  Receives Help From: Family  ADL Assistance: Independent  Homemaking Assistance: Independent  Ambulatory Assistance: Independent  Vocational: Retired  Leisure:  (Hiking)  Hand Dominance: Right  Prior Function Comments: Pt reports active and indep PTA     ADL:  Eating Assistance: Independent  Grooming Assistance: Minimal  Bathing Assistance: Moderate  UE Dressing Assistance: Minimal  LE Dressing Assistance: Moderate  Toileting Assistance with Device: Moderate  Activities of Daily Living:    LE Dressing  LE Dressing: Yes  Sock Level of Assistance: Maximum assistance  LE Dressing Where Assessed: Bed level  LE Dressing Comments: donning socks       Activity Tolerance:  Endurance: Decreased tolerance for upright activites  Static Sitting Balance:  Close supervision for safety sitting EOB ~10-12 minutes. Upon sitting EOB blisters noted on left side of back, RN notified      Bed Mobility/Transfers: Bed Mobility  Bed Mobility:  (max A trunk upright supine>seated EOB with HOB elevated and use of bed rail, pt able to advance BLE off bed; increased time and effort to perform with rest breaks secondary to pain)    Transfers  Transfer:  (mod A for balance and controlled descent sit<>stand bed and chair level; VCs for safe hand and leg placement)       Therapy/Activity: Therapeutic Activity  Therapeutic Activity Performed:  (mod A for balance for functional mobility ~3-4 steps bed>chair with arm in arm assist, pt with slow movements  this date)     Vision:Vision - Basic Assessment  Current Vision: Wears glasses all the time  Sensation:  Sensation Comment: pt denied numbness/paresthesia BUE  Strength:  Strength Comments: NT formally secondary to rib fxs/pain      Hand Function:  Hand Function  Gross Grasp: Functional  Coordination: Functional  Extremities: RUE   RUE : Within Functional Limits and LUE   LUE: Within Functional Limits    Outcome Measures: [facility] Daily Activity  Putting on and taking off regular lower body clothing: A lot  Bathing (including washing, rinsing, drying): A lot  Putting on and taking off regular upper body clothing: A lot  Toileting, which includes using toilet, bedpan or urinal: A lot  Taking care of personal grooming such as brushing teeth: A little  Eating Meals: None  Daily Activity - Total Score: 15    Education Documentation  ADL Training, taught by Danita Malin OT at 12/27/2023  3:28 PM.  Learner: Patient  Readiness: Acceptance  Method: Explanation, Demonstration  Response: Verbalizes Understanding, Needs Reinforcement    Education Comments  No comments found.      Goals:  Encounter Problems       Encounter Problems (Active)       ADLs       Pt will complete ADL tasks at mod I with use of AE prn  (Progressing)       Start:  12/27/23    Expected End:  01/17/24               Mobility       Pt will perform functional mobility household distances at mod I with use of LRD.    (Progressing)       Start:  12/27/23    Expected End:  01/17/24               Transfers       Pt will perform functional transfers at mod I. (Progressing)       Start:  12/27/23    Expected End:  01/17/24                   Physical Therapy                 Therapy Communication Note    Patient Name: Skip Selby  MRN: [id number]  Today's Date: 12/27/2023     Discipline: Physical Therapy    Missed Visit Reason: Missed Visit Reason: Patient refused (reports he would like another dose of pain medication prior to mobility)    Missed Time:  "Attempt    Occupational Therapy                 Therapy Communication Note    Patient Name: Skip Selby  MRN: [id number]  Today's Date: 12/27/2023     Discipline: Occupational Therapy    Missed Visit Reason: Missed Visit Reason: Patient refused (OT eval received and chart reviewed. Pt politely requesting therapy return at a later time secondary to pain)    Missed Time: Attempt        Skip Selby is a 83 y.o. male on day 2 of admission presenting with Multiple rib fractures involving first rib.    Subjective  HPI   Patient seen and examined, feels better today able to sleep less spasm pt said that he could not work with therapy wants her to come back  in the afternoon.  Patient denies any  shortness of breath in room air.  Patient tolerates well her diet with no nausea vomiting or abdominal pain.  No fever or chills.      Objective    Last Recorded Vitals  Blood pressure 154/76, pulse 78, temperature 36.5 °C (97.7 °F), temperature source Oral, resp. rate 14, height 1.702 m (5' 7\"), weight 94.9 kg (209 lb 3.5 oz), SpO2 95 %.      Intake/Output Summary (Last 24 hours) at 12/27/2023 0755  Last data filed at 12/26/2023 1830  Gross per 24 hour   Intake 300 ml   Output 300 ml   Net 0 ml           Physical Exam  Constitutional:       Appearance: Normal appearance.   HENT:      Nose: Nose normal.      Mouth/Throat:      Mouth: Mucous membranes are moist.   Eyes:      Extraocular Movements: Extraocular movements intact.      Pupils: Pupils are equal, round, and reactive to light.   Cardiovascular:      Rate and Rhythm: Normal rate and regular rhythm.   Pulmonary:      Effort: Pulmonary effort is normal.      Breath sounds: Normal breath sounds.   Abdominal:      General: Bowel sounds are normal.      Palpations: Abdomen is soft.   Musculoskeletal:         General: Tenderness present.      Cervical back: Normal range of motion and neck supple.      Comments: Ribs pain due to fall    Skin:     General: Skin is warm. "   Neurological:      General: No focal deficit present.      Mental Status: He is alert and oriented to person, place, and time.   Psychiatric:         Mood and Affect: Mood normal.        Relevant Results    Lab Results   Component Value Date    WBC 10.7 12/25/2023    HGB 14.9 12/25/2023    HCT 42.9 12/25/2023    MCV 91 12/25/2023     (L) 12/25/2023       Lab Results   Component Value Date    GLUCOSE 168 (H) 12/25/2023    CALCIUM 8.9 12/25/2023     12/25/2023    K 4.0 12/25/2023    CO2 23 (L) 12/25/2023     12/25/2023    BUN 28 (H) 12/25/2023    CREATININE 0.80 12/25/2023       Lab Results   Component Value Date    HGBA1C 5.5 08/27/2021       No results found.  Scheduled medications  acetaminophen, 650 mg, oral, q6h  amLODIPine, 2.5 mg, oral, q24h  apixaban, 5 mg, oral, q12h  aspirin, 81 mg, oral, Daily  baclofen, 10 mg, oral, TID  lidocaine, 1 patch, transdermal, Daily  losartan, 100 mg, oral, q24h  methocarbamol, 500 mg, oral, q8h HEATHER  metoprolol succinate XL, 25 mg, oral, Daily  oxyCODONE ER, 10 mg, oral, q12h HEATHER  polyethylene glycol, 17 g, oral, Daily  rosuvastatin, 10 mg, oral, Nightly      Continuous medications     PRN medications  PRN medications: naloxone, oxyCODONE-acetaminophen, oxyCODONE-acetaminophen    Assessment/Plan  Principal Problem:  Multiple rib fractures involving first rib/ ribs pain   CT chest shows multiple right-sided rib fractures.  No pneumothorax no pleural effusion   Keep pain under control   Keep binder on   PT/OT recommend SNF    Nausea/GERD  And complaining of nausea order Zofran as needed  Start him on pantoprazole f    Coronary artery disease, Hyperlipidemia,   No chest pain or shortness of breath  Continue with same home meds statin , aspirin Eliquis and beta-blocker  On Tele     Hypertension   BP is slightly elevated   Continue with the same regiment    DVT prophylaxis   On heparin     Discharge plan:  Pt s wife at the bedside answer to all her question and  concerns.  She choosed  the nursing home that his insurance can accept     Anticipate discharging patient to skilled of nursing   I spent 25 minutes in the professional and overall care of this patient.    JI Huber-KIRK          Occupational Therapy                 Therapy Communication Note    Patient Name: Skip Selby  MRN: [id number]  Today's Date: 12/26/2023     Discipline: Occupational Therapy    Missed Visit Reason: Missed Visit Reason: Patient refused (pt reporting his pain has calmed down right now and he wants to try to get some rest. pt willing to work with therapy tomorrow.)    Missed Time: Attempt    Comment:    Physical Therapy                 Therapy Communication Note    Patient Name: Skip Selby  MRN: [id number]  Today's Date: 12/26/2023     Discipline: Physical Therapy    Missed Visit Reason: Missed Visit Reason: Patient refused (pt reporting his pain has calmed down right now and he wants to try to get some rest. pt willing to work with therapy tomorrow.)    Missed Time: Attempt        Skip Selby is a 83 y.o. male on day 1 of admission presenting with Multiple rib fractures involving first rib.      Subjective  Having severe rib pain from muscle spasms.  Tolerating diet.   PT, OT is working with him.        Objective    Last Recorded Vitals  /78 (BP Location: Left arm, Patient Position: Lying)   Pulse 72   Temp 36.7 °C (98.1 °F) (Oral)   Resp 16   Wt 94.9 kg (209 lb 3.5 oz)   SpO2 94%   Intake/Output last 3 Shifts:    Intake/Output Summary (Last 24 hours) at 12/26/2023 1136  Last data filed at 12/26/2023 0300  Gross per 24 hour   Intake --   Output 1550 ml   Net -1550 ml       Admission Weight  Weight: 93.9 kg (207 lb) (12/25/23 0102)    Daily Weight  12/25/23 : 94.9 kg (209 lb 3.5 oz)    Image Results  CT chest wo IV contrast  Narrative: Interpreted By:  Parveen Spain,   STUDY:  CT CHEST WO IV CONTRAST; 12/25/2023 1:39 am      INDICATION:  Signs/Symptoms:fall, right  posterior and lateral rib pain.      COMPARISON:  None      ACCESSION NUMBER(S):  [id number]      ORDERING CLINICIAN:  MARLON JUNG      TECHNIQUE:  Contiguous axial images of the thorax were obtained from the level of  the thoracic inlet through the lung bases. 100 ml of Omnipaque 350  was utilized.All CT examinations are performed with 1 or more of the  following dose reduction techniques: Automated exposure control,  adjustment of mA and/or kv according to patient's size, or use of  iterative reconstruction techniques.          FINDINGS:  The thyroid gland is only partially visualized and otherwise  unremarkable.      The heart size is top-normal.. Prominent coronary artery  calcifications. No pericardial effusion is identified. The aorta and  pulmonary arteries are normal in caliber.      There are no pathologically enlarged mediastinal, hilar, or axillary  lymph nodes are seen.      The trachea and mainstem bronchi are patent. 8 mm calcified granuloma  in the left upper lobe. No significant consolidation. Mild dependent  changes. There is no evidence of pneumothorax. Trace right pleural  effusion. No left pleural effusion.      The visualized osseous structures show acute, single non segmented  right rib fractures at ribs 4, 5, 9, and 10. There also acute,  multiple segmental right rib fractures at ribs 6 and 7. Old healed  rib fractures of right ribs 3 and 4.      Limited images through the upper abdomen are unremarkable.      Impression: Acute right rib fractures at ribs 4, 5, 6, 7, 9, and 10.      Old healed right rib fractures of ribs 3 and 4.      Trace right pleural effusion.      Signed by: Parveen Spain 12/25/2023 9:12 AM  Dictation workstation:   [id number]      Physical Exam  Constitutional:       Appearance: Normal appearance.   HENT:      Head: Normocephalic and atraumatic.      Right Ear: Tympanic membrane normal.      Nose: Nose normal.      Mouth/Throat:      Mouth: Mucous membranes  are moist.   Eyes:      Pupils: Pupils are equal, round, and reactive to light.   Cardiovascular:      Rate and Rhythm: Normal rate and regular rhythm.      Pulses: Normal pulses.   Pulmonary:      Effort: Pulmonary effort is normal.      Breath sounds: Normal breath sounds.      Comments: Tender bilateral ribs.   Abdominal:      General: Bowel sounds are normal. There is distension.      Palpations: Abdomen is soft.      Tenderness: There is no abdominal tenderness. There is no guarding.      Hernia: No hernia is present.   Genitourinary:     Rectum: Normal.   Musculoskeletal:         General: Normal range of motion.   Skin:     General: Skin is warm and dry.   Neurological:      General: No focal deficit present.      Mental Status: He is alert.   Psychiatric:         Mood and Affect: Mood normal.         Behavior: Behavior normal.         Relevant Results  Lab Results   Component Value Date    GLUCOSE 168 (H) 12/25/2023    CALCIUM 8.9 12/25/2023     12/25/2023    K 4.0 12/25/2023    CO2 23 (L) 12/25/2023     12/25/2023    BUN 28 (H) 12/25/2023    CREATININE 0.80 12/25/2023     Lab Results   Component Value Date    WBC 10.7 12/25/2023    HGB 14.9 12/25/2023    HCT 42.9 12/25/2023    MCV 91 12/25/2023     (L) 12/25/2023       Assessment/Plan                 Principal Problem:    Multiple rib fractures involving first rib    12/26/23: Multimodal pain regimen. Diet as tolerated. Colace. PT, OT, IS.      15 minutes spent in chart review, pt care and assessment.          Lucina Inman, JI-CNP        Occupational Therapy                 Therapy Communication Note    Patient Name: Skip Selby  MRN: [id number]  Today's Date: 12/26/2023     Discipline: Occupational Therapy    Missed Visit Reason: Missed Visit Reason: Patient refused (pt declined therapy at this time due to pain and excruciating muscle spasms)    Missed Time: Attempt    Comment:    Physical Therapy                 Therapy  "Communication Note    Patient Name: Skip Selby  MRN: [id number]  Today's Date: 12/26/2023     Discipline: Physical Therapy    Missed Visit Reason: Missed Visit Reason: Patient refused (pt declined therapy at this time due to pain and excruciating muscle spasms)    Missed Time: Attempt        Skip Selby is a 83 y.o. male on day 1 of admission presenting with Multiple rib fractures involving first rib.    Subjective  HPI   Patient seen and examined, complains with spasm on his chest almost continuously barely can talk with just got baclofen OxyContin   Pt said that he could not work with therapy wants her to come back and work with him in the afternoon.  Patient denies any  shortness of breath in room air.  Patient tolerates well her diet with no nausea vomiting or abdominal pain.  No fever or chills.  No headache or dizziness.      Objective    Last Recorded Vitals  Blood pressure 140/76, pulse 72, temperature 36.4 °C (97.5 °F), temperature source Oral, resp. rate 12, height 1.702 m (5' 7\"), weight 94.9 kg (209 lb 3.5 oz), SpO2 95 %.      Intake/Output Summary (Last 24 hours) at 12/26/2023 0839  Last data filed at 12/26/2023 0300  Gross per 24 hour   Intake --   Output 1550 ml   Net -1550 ml         Physical Exam  Constitutional:       Appearance: Normal appearance.   HENT:      Nose: Nose normal.      Mouth/Throat:      Mouth: Mucous membranes are moist.   Eyes:      Extraocular Movements: Extraocular movements intact.      Pupils: Pupils are equal, round, and reactive to light.   Cardiovascular:      Rate and Rhythm: Normal rate and regular rhythm.   Pulmonary:      Effort: Pulmonary effort is normal.      Breath sounds: Normal breath sounds.   Abdominal:      General: Bowel sounds are normal.      Palpations: Abdomen is soft.   Musculoskeletal:         General: Tenderness present.      Cervical back: Normal range of motion and neck supple.      Comments: Ribs pain due to fall    Skin:     General: Skin is " warm.   Neurological:      General: No focal deficit present.      Mental Status: He is alert and oriented to person, place, and time.   Psychiatric:         Mood and Affect: Mood normal.        Relevant Results    Lab Results   Component Value Date    WBC 10.7 12/25/2023    HGB 14.9 12/25/2023    HCT 42.9 12/25/2023    MCV 91 12/25/2023     (L) 12/25/2023       Lab Results   Component Value Date    GLUCOSE 168 (H) 12/25/2023    CALCIUM 8.9 12/25/2023     12/25/2023    K 4.0 12/25/2023    CO2 23 (L) 12/25/2023     12/25/2023    BUN 28 (H) 12/25/2023    CREATININE 0.80 12/25/2023       Lab Results   Component Value Date    HGBA1C 5.5 08/27/2021       No results found.  Scheduled medications  amLODIPine, 2.5 mg, oral, q24h  apixaban, 5 mg, oral, q12h  aspirin, 81 mg, oral, Daily  baclofen, 10 mg, oral, TID  losartan, 100 mg, oral, q24h  metoprolol succinate XL, 25 mg, oral, Daily  oxyCODONE ER, 10 mg, oral, q12h HEATHER  polyethylene glycol, 17 g, oral, Daily  rosuvastatin, 10 mg, oral, Nightly      Continuous medications     PRN medications  PRN medications: oxyCODONE-acetaminophen, oxyCODONE-acetaminophen    Assessment/Plan  Principal Problem:  Multiple rib fractures involving first rib  CT chest shows multiple right-sided rib fractures.  No pneumothorax no pleural effusion   Keep pain under control   Order binder  PT/OT     Coronary artery disease, Hyperlipidemia,   No chest pain or shortness of breath  Continue with same home meds statin , aspirin Eliquis and beta-blocker  On Tele     Hypertension   BP is slightly elevated   Continue with the same regiment    DVT prophylaxis   On heparin     Discharge plan:  Discussed with the surgery team  Discussed with surgery team will see him today and put him on Robaxin and Narcan   Anticipate discharging patient home with home health care vs skilled of nursing when medically clear    I spent 25 minutes in the professional and overall care of this  "patient.    Eufemia Marti, APRN-CNP          Skip Selby is a 83 y.o. male on day 0 of admission presenting with Multiple rib fractures involving first rib.    Subjective  HPI   Patient seen and examined, complains with ribs pain especially with change of position.  Patient was not able to work with PT today.   Patient denies any  shortness of breath in room air.  Patient tolerates well her diet with no nausea vomiting or abdominal pain.  No fever or chills.  No headache or dizziness.      Objective    Last Recorded Vitals  Blood pressure 161/85, pulse 84, temperature 36.6 °C (97.9 °F), temperature source Oral, resp. rate 20, height 1.727 m (5' 8\"), weight 91.4 kg (201 lb 8 oz), SpO2 96 %.    No intake or output data in the 24 hours ending 12/25/23 0855      Physical Exam  Constitutional:       Appearance: Normal appearance.   HENT:      Nose: Nose normal.      Mouth/Throat:      Mouth: Mucous membranes are moist.   Eyes:      Extraocular Movements: Extraocular movements intact.      Pupils: Pupils are equal, round, and reactive to light.   Cardiovascular:      Rate and Rhythm: Normal rate and regular rhythm.   Pulmonary:      Effort: Pulmonary effort is normal.      Breath sounds: Normal breath sounds.   Abdominal:      General: Bowel sounds are normal.      Palpations: Abdomen is soft.   Musculoskeletal:         General: Tenderness present.      Cervical back: Normal range of motion and neck supple.      Comments: Ribs pain due to fall    Skin:     General: Skin is warm.   Neurological:      General: No focal deficit present.      Mental Status: He is alert and oriented to person, place, and time.   Psychiatric:         Mood and Affect: Mood normal.        Relevant Results    Lab Results   Component Value Date    WBC 10.7 12/25/2023    HGB 14.9 12/25/2023    HCT 42.9 12/25/2023    MCV 91 12/25/2023     (L) 12/25/2023       Lab Results   Component Value Date    GLUCOSE 168 (H) 12/25/2023    CALCIUM 8.9 " 12/25/2023     12/25/2023    K 4.0 12/25/2023    CO2 23 (L) 12/25/2023     12/25/2023    BUN 28 (H) 12/25/2023    CREATININE 0.80 12/25/2023       Lab Results   Component Value Date    HGBA1C 5.5 08/27/2021       No results found.  Scheduled medications  amLODIPine, 2.5 mg, oral, q24h  apixaban, 5 mg, oral, q12h  aspirin, 81 mg, oral, Daily  losartan, 100 mg, oral, q24h  metoprolol succinate XL, 25 mg, oral, Daily  oxyCODONE ER, 10 mg, oral, q12h HEATHER  polyethylene glycol, 17 g, oral, Daily  rosuvastatin, 10 mg, oral, Nightly      Continuous medications     PRN medications  PRN medications: oxyCODONE-acetaminophen, oxyCODONE-acetaminophen    Assessment/Plan  Principal Problem:  Multiple rib fractures involving first rib  CT chest shows multiple right-sided rib fractures.  No pneumothorax no pleural effusion   Keep pain under control   PT/OT     Coronary artery disease, Hyperlipidemia,   No chest pain or shortness of breath  Continue with same home meds statin , aspirin Eliquis and beta-blocker  On Tele     Hypertension   BP is slightly elevated   Continue with the same regiment    DVT prophylaxis   On heparin     Discharge plan:  Anticipate discharging patient home with home health care vs skilled of nursing when medically clear    I spent 25 minutes in the professional and overall care of this patient.    Eufemia Marti APRN-CNP          Physical Therapy    Physical Therapy Evaluation    Patient Name: Skip Selby  MRN: [id number]  Today's Date: 12/25/2023   Time Calculation  Start Time: 0736  Stop Time: 0759  Time Calculation (min): 23 min    Assessment/Plan  PT Assessment  PT Assessment Results: Decreased strength, Decreased range of motion, Decreased endurance, Impaired balance, Decreased mobility, Obesity, Pain  Rehab Prognosis: Good  Barriers to Discharge: Pain  Evaluation/Treatment Tolerance: Patient limited by pain  Medical Staff Made Aware: Yes  Strengths: Ability to acquire knowledge, Insight  into problems, Leisure activity, Support of Caregivers  Barriers to Participation: Comorbidities  End of Session Communication: Bedside nurse  Assessment Comment: Pt is a 83 y.o. male presenting following a fall with high pain levels due to multiple right sided rib fractures. Pt with decreased strength, poor endurance, decreased functional mobility, gait deficits, impaired balance, and is at risk for falls. Pt would benefit from moderate intensity needs at discharge.  End of Session Patient Position: Bed, 2 rail up  IP OR SWING BED PT PLAN  Inpatient or Swing Bed: Inpatient  PT Plan  Treatment/Interventions: Bed mobility, Transfer training, Gait training, Stair training, Neuromuscular re-education, Strengthening, Endurance training, Range of motion, Therapeutic exercise, Therapeutic activity, Home exercise program, Positioning, Postural re-education  PT Plan: Skilled PT  PT Frequency: 3 times per week  PT Discharge Recommendations: Moderate intensity level of continued care  Equipment Recommended upon Discharge: Straight cane, Wheeled walker  PT Recommended Transfer Status: Assist x1, Assistive device      Subjective  General Visit Information:  General  Reason for Referral: weakness  Referred By: Dr. Ochoa  Past Medical History Relevant to Rehab: CAD, HTN, Hyperlipidemia  Missed Visit: No  Prior to Session Communication: Bedside nurse  Patient Position Received: Bed, 2 rail up  Preferred Learning Style: verbal  General Comment: Pt cleared for therapy by nursing. Supine in bed upon arrival. Agreeable to PT session.  Home Living:  Home Living  Type of Home: House  Lives With: Spouse  Home Adaptive Equipment: None  Home Layout: Two level, Bed/bath upstairs, Stairs to alternate level with rails  Home Access: Stairs to enter without rails  Entrance Stairs-Number of Steps: 3  Bathroom Shower/Tub: Walk-in tub, Walk-in shower  Bathroom Toilet: Standard  Bathroom Equipment: None  Prior Level of Function:  Prior Function Per  Pt/Caregiver Report  Level of [place]: Independent with homemaking with ambulation, Independent with ADLs and functional transfers  ADL Assistance: Independent  Homemaking Assistance: Independent  Ambulatory Assistance: Independent  Vocational: Retired  Leisure:  (Hiking)  Precautions:  Precautions  Medical Precautions: Fall precautions  Vital Signs:  See flowsheet    Objective  Pain:  Pain Assessment  Pain Assessment: 0-10  Pain Score: 5 - Moderate pain  Pain Type: Acute pain  Pain Location: Rib cage (Right)  Pain Orientation: Right  Pain Descriptors: Jabbing  Clinical Progression: Not changed  Patient's Stated Pain Goal: No pain  Pain Interventions: Repositioned  Cognition:  Cognition  Overall Cognitive Status: Within Functional Limits  Insight: Within function limits    General Assessments:  General Observation  General Observation:  (Catheter)         Activity Tolerance  Endurance: Tolerates less than 10 min exercise, no significant change in vital signs, Decreased tolerance for upright activites  Early Mobility/Exercise Safety Screen: Proceed with mobilization - No exclusion criteria met    Sensation  Light Touch: No apparent deficits    Coordination  Movements are Fluid and Coordinated: Yes    Postural Control  Posture Comment: Moderate forward head, leans to left to offweight right UE/LE    Static Sitting Balance  Static Sitting-Balance Support: Bilateral upper extremity supported  Static Sitting-Level of Assistance: Close supervision  Static Sitting-Comment/Number of Minutes: 2  Functional Assessments:       Bed Mobility  Bed Mobility: Yes  Bed Mobility 1  Bed Mobility 1: Supine to sitting  Level of Assistance 1: Moderate assistance, Moderate verbal cues  Bed Mobility Comments 1:  (Performs supine to sit at mod A. Assist for lowering bilat LEs over EOB. Then assist and cues required for upright trunk positioning. Pt instructed to perform logroll to left and reach for bed rails. Then instructed to push  through elbows.)  Bed Mobility 2  Bed Mobility  2: Sitting to supine  Level of Assistance 2: Moderate assistance, Moderate verbal cues  Bed Mobility Comments 2:  (Mod A for bilat LE positioning and trunk return to supine)    Transfers  Transfer: No    Ambulation/Gait Training  Ambulation/Gait Training Performed: No    Stairs  Stairs: No  Extremity/Trunk Assessments:  RLE   RLE : Within Functional Limits  LLE   LLE : Within Functional Limits  Outcome Measures:  [facility] Basic Mobility  Turning from your back to your side while in a flat bed without using bedrails: A little  Moving from lying on your back to sitting on the side of a flat bed without using bedrails: A lot  Moving to and from bed to chair (including a wheelchair): A lot  Standing up from a chair using your arms (e.g. wheelchair or bedside chair): A lot  To walk in hospital room: A lot  Climbing 3-5 steps with railing: A lot  Basic Mobility - Total Score: 13    Encounter Problems       Encounter Problems (Active)       Mobility       STG - Patient will ambulate 50 feet with modified independent assist       Start:  12/25/23    Expected End:  01/08/24               Pain          Transfers       STG - Patient to transfer to and from sit to supine with modified independent        Start:  12/25/23    Expected End:  01/08/24            STG - Patient will transfer sit to and from stand with modified independent       Start:  12/25/23    Expected End:  01/08/24                             History Of Present Illness  Skip Selby is a 83 y.o. male presenting with fall..    He slipped and fell yesterday morning and since then has had excruciating right-sided chest pain.  He has been found to have multiple right-sided rib fractures     Past Medical History  He has a past medical history of Coronary artery disease, Hyperlipidemia, Hypertension, Other specified health status, and Overactive bladder.  Fibrillation    Surgical History  He has a past surgical history  that includes Other surgical history (02/26/2021).     Social History  He reports that he has never smoked. He has never used smokeless tobacco. No history on file for alcohol use and drug use.    Family History  No family history on file.     Allergies  Patient has no known allergies.    Review of Systems     Physical Exam  Alert oriented undistressed  Heart irregular  Lungs clear to auscultation  Chest wall right side ecchymotic and tender  Abdomen soft nontender nondistended  Extremities 1-2+ pitting bilateral leg edema  Last Recorded Vitals  /69   Pulse 91   Temp 36.8 °C (98.2 °F) (Oral)   Resp 14   Wt 93.9 kg (207 lb)   SpO2 94%     Relevant Results  CT chest shows multiple right-sided rib fractures.  No pneumothorax no pleural effusion     Assessment/Plan  Painful rib fractures-ordering OxyContin plus as needed Percocet.  Incentive spirometry.  Physical and Occupational Therapy.    Lower all of his regular home medications.  Telemetry monitoring for now  Jayant Ochoa MD

## 2023-12-25 NOTE — CONSULTS
Assessment/Plan   No acute trauma intervention planned or indicated.  Continue physical therapy, incentive spirometry, pain management.  Workup of balance/instability per internal medicine.    Inpatient consult to Acute Care Surgery  Consult performed by: Ishaan Pandya MD  Consult ordered by: Krzysztof Ochoa DO        Subjective complains of pain with movement, coughing, laughing.  Denies shortness of breath.        Review of Systems  Review of Systems   All other systems reviewed and are negative.  Negative except as described in the history of present illness.    Objective     Vital signs for last 24 hours:  Temp:  [36.5 °C (97.7 °F)-36.8 °C (98.2 °F)] 36.8 °C (98.2 °F)  Heart Rate:  [68-91] 84  Resp:  [14-20] 18  BP: (147-171)/(69-88) 160/86    Intake/Output this shift:  No intake/output data recorded.    Physical Exam  Physical Exam  Constitutional:       General: He is awake. He is not in acute distress.  HENT:      Head: Normocephalic and atraumatic.   Cardiovascular:      Heart sounds: S1 normal and S2 normal. No murmur heard.  Pulmonary:      Breath sounds: No wheezing, rhonchi or rales.      Comments: Clear to auscultation bilaterally.  Tenderness over the chest wall consistent with known rib fractures.  Abdominal:      General: Bowel sounds are normal. There is no distension.      Palpations: Abdomen is soft.      Tenderness: There is no abdominal tenderness.   Genitourinary:     Comments: Deferred  Musculoskeletal:      Cervical back: Neck supple.      Comments: No gross abnormalities   Lymphadenopathy:      Cervical: No cervical adenopathy.   Skin:     General: Skin is warm and dry.   Neurological:      General: No focal deficit present.      Mental Status: He is alert and oriented to person, place, and time.   Psychiatric:         Behavior: Behavior is cooperative.         Labs  CBC:   Lab Results   Component Value Date    WBC 10.7 12/25/2023    RBC 4.70 12/25/2023     BMP:   Lab Results    Component Value Date    GLUCOSE 168 (H) 12/25/2023    CO2 23 (L) 12/25/2023    BUN 28 (H) 12/25/2023    CREATININE 0.80 12/25/2023    CALCIUM 8.9 12/25/2023     Radiology review: CT chest wo IV contrast    Result Date: 12/25/2023  Interpreted By:  Parveen Spain, STUDY: CT CHEST WO IV CONTRAST; 12/25/2023 1:39 am   INDICATION: Signs/Symptoms:fall, right posterior and lateral rib pain.   COMPARISON: None   ACCESSION NUMBER(S): [id number]   ORDERING CLINICIAN: MARLON JUNG   TECHNIQUE: Contiguous axial images of the thorax were obtained from the level of the thoracic inlet through the lung bases. 100 ml of Omnipaque 350 was utilized.All CT examinations are performed with 1 or more of the following dose reduction techniques: Automated exposure control, adjustment of mA and/or kv according to patient's size, or use of iterative reconstruction techniques.     FINDINGS: The thyroid gland is only partially visualized and otherwise unremarkable.   The heart size is top-normal.. Prominent coronary artery calcifications. No pericardial effusion is identified. The aorta and pulmonary arteries are normal in caliber.   There are no pathologically enlarged mediastinal, hilar, or axillary lymph nodes are seen.   The trachea and mainstem bronchi are patent. 8 mm calcified granuloma in the left upper lobe. No significant consolidation. Mild dependent changes. There is no evidence of pneumothorax. Trace right pleural effusion. No left pleural effusion.   The visualized osseous structures show acute, single non segmented right rib fractures at ribs 4, 5, 9, and 10. There also acute, multiple segmental right rib fractures at ribs 6 and 7. Old healed rib fractures of right ribs 3 and 4.   Limited images through the upper abdomen are unremarkable.       Acute right rib fractures at ribs 4, 5, 6, 7, 9, and 10.   Old healed right rib fractures of ribs 3 and 4.   Trace right pleural effusion.   Signed by: Parveen Spain  12/25/2023 9:12 AM Dictation workstation:   [id number]

## 2023-12-25 NOTE — H&P
History Of Present Illness  Skip Selby is a 83 y.o. male presenting with fall..    He slipped and fell yesterday morning and since then has had excruciating right-sided chest pain.  He has been found to have multiple right-sided rib fractures     Past Medical History  He has a past medical history of Coronary artery disease, Hyperlipidemia, Hypertension, Other specified health status, and Overactive bladder.  Fibrillation    Surgical History  He has a past surgical history that includes Other surgical history (02/26/2021).     Social History  He reports that he has never smoked. He has never used smokeless tobacco. No history on file for alcohol use and drug use.    Family History  No family history on file.     Allergies  Patient has no known allergies.    Review of Systems     Physical Exam  Alert oriented undistressed  Heart irregular  Lungs clear to auscultation  Chest wall right side ecchymotic and tender  Abdomen soft nontender nondistended  Extremities 1-2+ pitting bilateral leg edema  Last Recorded Vitals  /69   Pulse 91   Temp 36.8 °C (98.2 °F) (Oral)   Resp 14   Wt 93.9 kg (207 lb)   SpO2 94%     Relevant Results  CT chest shows multiple right-sided rib fractures.  No pneumothorax no pleural effusion     Assessment/Plan   Painful rib fractures-ordering OxyContin plus as needed Percocet.  Incentive spirometry.  Physical and Occupational Therapy.    Lower all of his regular home medications.  Telemetry monitoring for now  Jayant Ochoa MD

## 2023-12-25 NOTE — CARE PLAN
The patient's goals for the shift include No fall    The clinical goals for the shift include pain management

## 2023-12-25 NOTE — ED PROVIDER NOTES
HPI   Chief Complaint   Patient presents with    Fall       This is an 83-year-old male with a past medical history of paroxysmal A-fib on Eliquis presenting to the ED after a fall for evaluation of severe right-sided lateral and posterior rib pain.  He states he was walking in a park and slipped on some wet ground, fell onto his right side.  He did not hit his head or lose consciousness.  He states that he was able to get up and get home.  The fall occurred about 12 hours ago.  He states that after resting at home the pain only got worse, he is unable to get up or ambulate due to the severity of the pain.  He denies any shortness of breath or cough, any chest pain or any other injuries from the fall, however due to the worsening of his pain he was brought to the ED for assessment.      History provided by:  Patient   used: No                        No data recorded                Patient History   Past Medical History:   Diagnosis Date    Coronary artery disease     Hyperlipidemia     Hypertension     Other specified health status     No known health problems    Overactive bladder      Past Surgical History:   Procedure Laterality Date    OTHER SURGICAL HISTORY  02/26/2021    Gallbladder surgery     No family history on file.  Social History     Tobacco Use    Smoking status: Never    Smokeless tobacco: Never   Substance Use Topics    Alcohol use: Not on file    Drug use: Not on file       Physical Exam   ED Triage Vitals   Temp Heart Rate Resp BP   12/25/23 0102 12/25/23 0102 12/25/23 0102 12/25/23 0102   36.8 °C (98.2 °F) 68 14 171/88      SpO2 Temp Source Heart Rate Source Patient Position   12/25/23 0102 12/25/23 0102 12/25/23 0552 12/25/23 0552   94 % Oral Brachial Lying      BP Location FiO2 (%)     12/25/23 0552 --     Left arm        Physical Exam  General: well developed, well nourished 83-year-old male who is awake and alert, lying flat in the bed, in no apparent distress  Eyes: sclera  clear bilaterally  HENT: normocephalic, atraumatic.   CV: regular rate and rhythm, no murmur, no gallops, or rubs. radial and dorsalis pedis pulses +2/4 bilaterally  Resp: clear to ascultation bilaterally, no wheezes, rales, or rhonchi  GI: abdomen soft, nontender without rigidity or guarding, no peritoneal signs, abdomen is nondistended, no masses palpated  MSK: No midline spinal tenderness, strength +5/5 to upper and lower extremities bilaterally, no swelling of the extremities.  No tenderness to the hips or pelvis, the upper or lower extremities, the shoulders.  He has severe tenderness to the right lateral and posterior ribs, pain is worsened with any movement of the torso.  Neuro: Sensation fully intact.  Psych: appropriate mood and affect, cooperative with exam  Skin: warm, dry, without evidence of rash or abrasions    ED Course & MDM   ED Course as of 12/25/23 1615   Mon Dec 25, 2023   0504 EKG: Sinus rhythm with first-degree AV block, rate of 94 beats minute, left axis deviation.  QTc is 427 ms.  OH interval is 216 ms. No ST elevation or depression, no acute ischemic pattern.  No STEMI. [NT]      ED Course User Index  [NT] Krzysztof Ochoa DO         Diagnoses as of 12/25/23 1615   Fall, initial encounter   Closed fracture of multiple ribs of right side, initial encounter       Medical Decision Making  PMH: Paroxysmal A-fib on Eliquis  History obtained: directly from patient   Social factors affecting disposition: none    He is only flat in the bed but is in no acute distress here.  He is saturating 94% on room air lying flat, he is hypertensive, vitals otherwise normal.  He has severe tenderness to the right ribs, no other injuries identified on exam.  He did not hit his head when he fell denies any headache, any neurologic symptoms.  I would expect after 12 hours if he had a significant intracranial hemorrhage that was expanding he would develop other symptoms to suggest this.  I do not feel that head  CT is necessary at this time.  He was sent for chest CT which reveals multiple right-sided rib fractures, some of which are multisegmental.  I did personally review the CT imaging, I do not see any pneumothorax, any evidence of pulmonary contusion.  He was treated with morphine in the ED for pain control.  He is still unable to get up and ambulate due to the severity of his pain and does not feel comfortable going home due to this fact.  I did consult the trauma surgeon Dr. Garza and who feels that he is in agreement that the patient would benefit from admission for multimodal pain control, PT and OT assessment as he may need acute rehab if his functionality significantly impaired even with adequate pain control measures.  The patient was admitted to the hospitalist in stable condition for further medical management.    Labs Reviewed   CBC WITH AUTO DIFFERENTIAL - Abnormal       Result Value    WBC 10.7      nRBC 0.0      RBC 4.70      Hemoglobin 14.9      Hematocrit 42.9      MCV 91      MCH 31.7      MCHC 34.7      RDW 12.5      Platelets 140 (*)     Neutrophils % 82.4      Immature Granulocytes %, Automated 0.3      Lymphocytes % 12.3      Monocytes % 4.5      Eosinophils % 0.2      Basophils % 0.3      Neutrophils Absolute 8.78 (*)     Immature Granulocytes Absolute, Automated 0.03      Lymphocytes Absolute 1.31      Monocytes Absolute 0.48      Eosinophils Absolute 0.02      Basophils Absolute 0.03     COMPREHENSIVE METABOLIC PANEL - Abnormal    Glucose 168 (*)     Sodium 138      Potassium 4.0      Chloride 105      Bicarbonate 23 (*)     Urea Nitrogen 28 (*)     Creatinine 0.80      eGFR 88      Calcium 8.9      Albumin 4.1      Alkaline Phosphatase 46      Total Protein 6.7      AST 24      Bilirubin, Total 0.7      ALT 25      Anion Gap 10       CT chest wo IV contrast   Final Result   Acute right rib fractures at ribs 4, 5, 6, 7, 9, and 10.        Old healed right rib fractures of ribs 3 and 4.         Trace right pleural effusion.        Signed by: Parveen Spain 12/25/2023 9:12 AM   Dictation workstation:   [id number]            Procedure  Procedures     Krzysztof Ochoa,   12/25/23 1317

## 2023-12-25 NOTE — ED TRIAGE NOTES
Patient reports a mechanical fall approximately 12 hours prior to arrival. Patient is complaining of right shoulder, ribs, and hip pain. Patient main concern is persistent pain.

## 2023-12-26 PROCEDURE — 2500000001 HC RX 250 WO HCPCS SELF ADMINISTERED DRUGS (ALT 637 FOR MEDICARE OP)

## 2023-12-26 PROCEDURE — 2500000001 HC RX 250 WO HCPCS SELF ADMINISTERED DRUGS (ALT 637 FOR MEDICARE OP): Performed by: INTERNAL MEDICINE

## 2023-12-26 PROCEDURE — 2500000004 HC RX 250 GENERAL PHARMACY W/ HCPCS (ALT 636 FOR OP/ED): Performed by: INTERNAL MEDICINE

## 2023-12-26 PROCEDURE — 2500000005 HC RX 250 GENERAL PHARMACY W/O HCPCS

## 2023-12-26 PROCEDURE — 1100000001 HC PRIVATE ROOM DAILY

## 2023-12-26 PROCEDURE — 2500000001 HC RX 250 WO HCPCS SELF ADMINISTERED DRUGS (ALT 637 FOR MEDICARE OP): Performed by: NURSE PRACTITIONER

## 2023-12-26 RX ORDER — METHOCARBAMOL 500 MG/1
500 TABLET, FILM COATED ORAL EVERY 8 HOURS SCHEDULED
Status: DISCONTINUED | OUTPATIENT
Start: 2023-12-26 | End: 2023-12-29 | Stop reason: HOSPADM

## 2023-12-26 RX ORDER — ACETAMINOPHEN 325 MG/1
650 TABLET ORAL EVERY 6 HOURS
Status: DISCONTINUED | OUTPATIENT
Start: 2023-12-26 | End: 2023-12-29 | Stop reason: HOSPADM

## 2023-12-26 RX ORDER — LIDOCAINE 560 MG/1
1 PATCH PERCUTANEOUS; TOPICAL; TRANSDERMAL DAILY
Status: DISCONTINUED | OUTPATIENT
Start: 2023-12-26 | End: 2023-12-29 | Stop reason: HOSPADM

## 2023-12-26 RX ORDER — NALOXONE HYDROCHLORIDE 0.4 MG/ML
0.4 INJECTION, SOLUTION INTRAMUSCULAR; INTRAVENOUS; SUBCUTANEOUS AS NEEDED
Status: DISCONTINUED | OUTPATIENT
Start: 2023-12-26 | End: 2023-12-29 | Stop reason: HOSPADM

## 2023-12-26 RX ADMIN — BACLOFEN 10 MG: 10 TABLET ORAL at 21:31

## 2023-12-26 RX ADMIN — OXYCODONE AND ACETAMINOPHEN 2 TABLET: 5; 325 TABLET ORAL at 15:19

## 2023-12-26 RX ADMIN — POLYETHYLENE GLYCOL 3350 17 G: 17 POWDER, FOR SOLUTION ORAL at 08:38

## 2023-12-26 RX ADMIN — METOPROLOL SUCCINATE 25 MG: 25 TABLET, EXTENDED RELEASE ORAL at 08:37

## 2023-12-26 RX ADMIN — APIXABAN 5 MG: 5 TABLET, FILM COATED ORAL at 08:37

## 2023-12-26 RX ADMIN — OXYCODONE AND ACETAMINOPHEN 2 TABLET: 5; 325 TABLET ORAL at 11:27

## 2023-12-26 RX ADMIN — APIXABAN 5 MG: 5 TABLET, FILM COATED ORAL at 21:31

## 2023-12-26 RX ADMIN — LIDOCAINE 1 PATCH: 4 PATCH TOPICAL at 11:27

## 2023-12-26 RX ADMIN — BACLOFEN 10 MG: 10 TABLET ORAL at 15:18

## 2023-12-26 RX ADMIN — METHOCARBAMOL TABLETS 500 MG: 500 TABLET, COATED ORAL at 21:31

## 2023-12-26 RX ADMIN — ASPIRIN 81 MG: 81 TABLET, COATED ORAL at 08:37

## 2023-12-26 RX ADMIN — OXYCODONE HYDROCHLORIDE 10 MG: 10 TABLET, FILM COATED, EXTENDED RELEASE ORAL at 08:38

## 2023-12-26 RX ADMIN — ROSUVASTATIN CALCIUM 10 MG: 10 TABLET, FILM COATED ORAL at 21:31

## 2023-12-26 RX ADMIN — METHOCARBAMOL TABLETS 500 MG: 500 TABLET, COATED ORAL at 15:18

## 2023-12-26 RX ADMIN — LOSARTAN POTASSIUM 100 MG: 100 TABLET, FILM COATED ORAL at 04:19

## 2023-12-26 RX ADMIN — OXYCODONE AND ACETAMINOPHEN 2 TABLET: 5; 325 TABLET ORAL at 04:19

## 2023-12-26 RX ADMIN — OXYCODONE HYDROCHLORIDE 10 MG: 10 TABLET, FILM COATED, EXTENDED RELEASE ORAL at 21:31

## 2023-12-26 RX ADMIN — ACETAMINOPHEN 650 MG: 325 TABLET ORAL at 11:35

## 2023-12-26 RX ADMIN — BACLOFEN 10 MG: 10 TABLET ORAL at 08:37

## 2023-12-26 RX ADMIN — AMLODIPINE BESYLATE 2.5 MG: 2.5 TABLET ORAL at 04:19

## 2023-12-26 ASSESSMENT — PAIN SCALES - GENERAL
PAINLEVEL_OUTOF10: 8
PAINLEVEL_OUTOF10: 7
PAINLEVEL_OUTOF10: 6
PAINLEVEL_OUTOF10: 10 - WORST POSSIBLE PAIN
PAINLEVEL_OUTOF10: 0 - NO PAIN
PAINLEVEL_OUTOF10: 0 - NO PAIN
PAINLEVEL_OUTOF10: 4
PAINLEVEL_OUTOF10: 10 - WORST POSSIBLE PAIN
PAINLEVEL_OUTOF10: 6

## 2023-12-26 ASSESSMENT — COGNITIVE AND FUNCTIONAL STATUS - GENERAL
MOVING TO AND FROM BED TO CHAIR: A LOT
CLIMB 3 TO 5 STEPS WITH RAILING: A LOT
EATING MEALS: A LITTLE
TURNING FROM BACK TO SIDE WHILE IN FLAT BAD: A LOT
MOBILITY SCORE: 13
DRESSING REGULAR UPPER BODY CLOTHING: A LOT
WALKING IN HOSPITAL ROOM: A LOT
TOILETING: A LOT
WALKING IN HOSPITAL ROOM: A LOT
MOBILITY SCORE: 13
HELP NEEDED FOR BATHING: A LOT
STANDING UP FROM CHAIR USING ARMS: A LOT
HELP NEEDED FOR BATHING: A LOT
DRESSING REGULAR UPPER BODY CLOTHING: A LOT
MOVING TO AND FROM BED TO CHAIR: A LOT
EATING MEALS: A LITTLE
TURNING FROM BACK TO SIDE WHILE IN FLAT BAD: A LOT
STANDING UP FROM CHAIR USING ARMS: A LOT
MOVING FROM LYING ON BACK TO SITTING ON SIDE OF FLAT BED WITH BEDRAILS: A LITTLE
DRESSING REGULAR LOWER BODY CLOTHING: A LOT
DAILY ACTIVITIY SCORE: 13
DRESSING REGULAR LOWER BODY CLOTHING: A LOT
DAILY ACTIVITIY SCORE: 13
TOILETING: A LOT
MOVING FROM LYING ON BACK TO SITTING ON SIDE OF FLAT BED WITH BEDRAILS: A LITTLE
PERSONAL GROOMING: A LOT
PERSONAL GROOMING: A LOT
CLIMB 3 TO 5 STEPS WITH RAILING: A LOT

## 2023-12-26 ASSESSMENT — PAIN - FUNCTIONAL ASSESSMENT
PAIN_FUNCTIONAL_ASSESSMENT: 0-10

## 2023-12-26 ASSESSMENT — PAIN DESCRIPTION - LOCATION
LOCATION: RIB CAGE
LOCATION: RIB CAGE

## 2023-12-26 NOTE — CONSULTS
"Nutrition Assessment Note    Nutrition Assessment    Reason for Assessment: Admission nursing screening (MST x 2)    Reason for Hospital Admission:  Skip Selby is a 83 y.o. male who is admitted for severe right-sided lateral and posterior rib pain.  He states he was walking in a park and slipped on some wet ground, fell onto his right side.     Nutrition History:  Food and Nutrient History: Pt stated that his appetite is not good and stated that he is eating \"bits and pieces here and there\". Pt thinks poor appetite is caused by the medications that he is taking       Anthropometrics:  Ht: 170.2 cm (5' 7\"), Wt: 94.9 kg (209 lb 3.5 oz), BMI: 32.76  IBW/kg (Dietitian Calculated): 67.27 kg  Percent of IBW: 141.07 %  Adjusted Body Weight (kg): 74.09 kg    Weight Change:  Weight History / % Weight Change: Pt stated UBW to be 200#.             Nutrition Focused Physical Exam Findings:   Subcutaneous Fat Loss  Orbital Fat Pads: Well nourshed (slightly bulging fat pads)  Buccal Fat Pads: Well nourished (full, rounded cheeks)  Triceps: Defer  Ribs: Defer    Muscle Wasting  Temporalis: Well nourished (well-defined muscle)  Pectoralis (Clavicular Region): Well nourished (clavicle not visible)  Deltoid/Trapezius: Defer  Interosseous: Defer  Trapezius/Infraspinatus/Supraspinatus (Scapular Region): Defer  Quadriceps: Defer  Gastrocnemius: Defer              Nutrition Significant Labs:  Lab Results   Component Value Date    WBC 10.7 12/25/2023    HGB 14.9 12/25/2023    HCT 42.9 12/25/2023     (L) 12/25/2023    CHOL 121 (L) 09/01/2023    TRIG 152 (H) 09/01/2023    HDL 42 09/01/2023    ALT 25 12/25/2023    AST 24 12/25/2023     12/25/2023    K 4.0 12/25/2023     12/25/2023    CREATININE 0.80 12/25/2023    BUN 28 (H) 12/25/2023    CO2 23 (L) 12/25/2023    TSH 2.91 09/01/2023    PSA 4.4 (H) 09/01/2023    HGBA1C 5.5 08/27/2021       Current Facility-Administered Medications:     acetaminophen (Tylenol) tablet 650 " mg, 650 mg, oral, q6h, GIOVANNI Suárez, 650 mg at 12/26/23 1135    amLODIPine (Norvasc) tablet 2.5 mg, 2.5 mg, oral, q24h, Jayant Ochoa MD, 2.5 mg at 12/26/23 0419    apixaban (Eliquis) tablet 5 mg, 5 mg, oral, q12h, Jayant Ochoa MD, 5 mg at 12/26/23 0837    aspirin EC tablet 81 mg, 81 mg, oral, Daily, Jayant Ochoa MD, 81 mg at 12/26/23 0837    baclofen (Lioresal) tablet 10 mg, 10 mg, oral, TID, Eufemia StinsonmGIOVANNI, 10 mg at 12/26/23 0837    lidocaine 4 % patch 1 patch, 1 patch, transdermal, Daily, GIOVANNI Suárez, 1 patch at 12/26/23 1127    losartan (Cozaar) tablet 100 mg, 100 mg, oral, q24h, Jayant Ochoa MD, 100 mg at 12/26/23 0419    methocarbamol (Robaxin) tablet 500 mg, 500 mg, oral, q8h [facility], GIOVANNI Suárez    metoprolol succinate XL (Toprol-XL) 24 hr tablet 25 mg, 25 mg, oral, Daily, Jayant Ochoa MD, 25 mg at 12/26/23 0837    naloxone (Narcan) injection 0.4 mg, 0.4 mg, intravenous, PRN, GIOVANNI Suárez    oxyCODONE ER (OxyCONTIN) 12 hr tablet 10 mg, 10 mg, oral, q12h HEATHER, Jayant Ochoa MD, 10 mg at 12/26/23 0838    oxyCODONE-acetaminophen (Percocet) 5-325 mg per tablet 1 tablet, 1 tablet, oral, q6h PRN, Jayant Ochoa MD    oxyCODONE-acetaminophen (Percocet) 5-325 mg per tablet 2 tablet, 2 tablet, oral, q4h PRN, Eufemia ZrdillonmJI-CNP, 2 tablet at 12/26/23 1127    polyethylene glycol (Glycolax, Miralax) packet 17 g, 17 g, oral, Daily, Jayant Ochoa MD, 17 g at 12/26/23 0838    rosuvastatin (Crestor) tablet 10 mg, 10 mg, oral, Nightly, Jayant Ochoa MD, 10 mg at 12/25/23 2017    Dietary Orders (From admission, onward)       Start     Ordered    12/25/23 0417  Adult diet Regular  Diet effective now        Question:  Diet type  Answer:  Regular    12/25/23 0420    12/25/23 0409  May Participate in Room Service  Once        Question:  .  Answer:  Yes    12/25/23 0408                  Estimated Needs:   Estimated Energy Needs  Total Energy Estimated Needs (kCal):  1850 kCal  Total Estimated Energy Need per Day (kCal/kg): 25 kCal/kg  Method for Estimating Needs: ABW    Estimated Protein Needs  Total Protein Estimated Needs (g): 74 g  Total Protein Estimated Needs (g/kg): 1 g/kg  Method for Estimating Needs: ABW    Estimated Fluid Needs  Total Fluid Estimated Needs (mL): 1850 mL  Method for Estimating Needs: 1 mL/kcal        Nutrition Diagnosis   Nutrition Diagnosis:       Nutrition Diagnosis  Patient has Nutrition Diagnosis: Yes  Diagnosis Status (1): New  Nutrition Diagnosis 1: Inadequate energy intake  Related to (1): Decreased ability to consume sufficient energy  As Evidenced by (1): Appetite changes       Nutrition Interventions/Recommendations   Nutrition Interventions and Recommendations:    Nutrition Prescription:  Individualized Nutrition Prescription Provided for : 1850 kcals, 74 gm protein via regular diet    Nutrition Interventions:   Food and/or Nutrient Delivery Interventions  Interventions: Meals and snacks, Medical food supplement  Meals and Snacks: General healthful diet  Goal: Provide diet as ordered  Medical Food Supplement: Commercial beverage  Goal: Will provide Ensure High Protein 1x daily to provide an additional 160 kcals, 16 gm protein per serving to increase PO intake.    Education Documentation  No documentation found.           Nutrition Monitoring and Evaluation   Monitoring/Evaluation:   Food/Nutrient Related History Monitoring  Monitoring and Evaluation Plan: Energy intake  Energy Intake: Estimated energy intake  Criteria: Pt to consume >/= 75% of estimated needs         Time Spent/Follow-up:   Follow Up  Time Spent (min): 30 minutes  Last Date of Nutrition Visit: 12/26/23  Nutrition Follow-Up Needed?: 5-7 days  Follow up Comment: 1/2/24

## 2023-12-26 NOTE — PROGRESS NOTES
Occupational Therapy                 Therapy Communication Note    Patient Name: Skip Selby  MRN: [id number]  Today's Date: 12/26/2023     Discipline: Occupational Therapy    Missed Visit Reason: Missed Visit Reason: Patient refused (pt declined therapy at this time due to pain and excruciating muscle spasms)    Missed Time: Attempt    Comment:

## 2023-12-26 NOTE — NURSING NOTE
Assumed care of patient, BSSR complete, patient resting in bed with no needs at this time, states pain is tolerable, denies any nausea, male purewick in place, bed alarm set and call light within reach

## 2023-12-26 NOTE — CARE PLAN
The patient's goals for the shift include No fall    The clinical goals for the shift include pain control, fall precautions, comfort and rest      Problem: Fall/Injury  Goal: Not fall by end of shift  Outcome: Progressing  Goal: Be free from injury by end of the shift  Outcome: Progressing  Goal: Verbalize understanding of personal risk factors for fall in the hospital  Outcome: Progressing  Goal: Verbalize understanding of risk factor reduction measures to prevent injury from fall in the home  Outcome: Progressing  Goal: Use assistive devices by end of the shift  Outcome: Progressing  Goal: Pace activities to prevent fatigue by end of the shift  Outcome: Progressing     Problem: Pain  Goal: Takes deep breaths with improved pain control throughout the shift  Outcome: Progressing  Goal: Turns in bed with improved pain control throughout the shift  Outcome: Progressing  Goal: Walks with improved pain control throughout the shift  Outcome: Progressing  Goal: Performs ADL's with improved pain control throughout shift  Outcome: Progressing  Goal: Participates in PT with improved pain control throughout the shift  Outcome: Progressing  Goal: Free from opioid side effects throughout the shift  Outcome: Progressing  Goal: Free from acute confusion related to pain meds throughout the shift  Outcome: Progressing     Problem: Pain  Goal: STG - Patients pain is managed to allow active participation in daily activities with <4/10 pain  Outcome: Progressing     Problem: Skin  Goal: Participates in plan/prevention/treatment measures  Outcome: Progressing  Flowsheets (Taken 12/26/2023 0744)  Participates in plan/prevention/treatment measures:   Discuss with provider PT/OT consult   Increase activity/out of bed for meals   Elevate heels  Goal: Prevent/manage excess moisture  Outcome: Progressing  Flowsheets (Taken 12/26/2023 0744)  Prevent/manage excess moisture:   Cleanse incontinence/protect with barrier cream   Moisturize dry  skin   Use wicking fabric (obtain order)   Follow provider orders for dressing changes   Monitor for/manage infection if present  Goal: Prevent/minimize sheer/friction injuries  Outcome: Progressing  Flowsheets (Taken 12/26/2023 0744)  Prevent/minimize sheer/friction injuries:   Complete micro-shifts as needed if patient unable. Adjust patient position to relieve pressure points, not a full turn   HOB 30 degrees or less   Increase activity/out of bed for meals   Turn/reposition every 2 hours/use positioning/transfer devices   Use pull sheet   Utilize specialty bed per algorithm  Goal: Promote/optimize nutrition  Outcome: Progressing  Flowsheets (Taken 12/26/2023 0744)  Promote/optimize nutrition:   Assist with feeding   Discuss with provider if NPO > 2 days   Offer water/supplements/favorite foods   Consume > 50% meals/supplements   Monitor/record intake including meals   Reassess MST if dietician not consulted  Goal: Promote skin healing  Outcome: Progressing  Flowsheets (Taken 12/26/2023 0744)  Promote skin healing:   Assess skin/pad under line(s)/device(s)   Ensure correct size (line/device) and apply per  instructions   Protective dressings over bony prominences   Rotate device position/do not position patient on device   Turn/reposition every 2 hours/use positioning/transfer devices

## 2023-12-26 NOTE — PROGRESS NOTES
Spiritual Care Visit    Clinical Encounter Type  Visited With: Patient  Routine Visit: Introduction  Continue Visiting: Yes         Values/Beliefs  Spiritual Requests During Hospitalization: [place]. Gavi Osman

## 2023-12-26 NOTE — PROGRESS NOTES
Skip Selby is a 83 y.o. male on day 1 of admission presenting with Multiple rib fractures involving first rib. Since then has had excruciating right-sided chest pain.  He has been found to have multiple right-sided rib fractures     Patient seen at bedside , introduced myself and reason for visit. Noted that patient refused PT/OT due to pain and excruciating muscle spasms.  Having severe rib pain from muscle spasms frequently. He reports that he has never smoked. He has never used smokeless tobacco. No history on file for alcohol use and drug use. Currently patient lives at home with spouse. 2 level home, bed/bath upstairs.. # steps from the outside. Has a Walk-in tub and shower. Patient has been independent with ADLs and homemaking needs. Patient is retired and enjoys hiking with his wife. Patient slipped while hiking, fractured his ribs. Anticipate discharging patient home with home health care vs skilled of nursing when medically clear. Was given skilled rehab choices and said his wife would look at them.  Will follow-up.     PLAN: Anticipate discharging patient home with home health care vs skilled of nursing. Choices given, needs to make decision and precert is needed ([employer])     Jazmin Zarco RN

## 2023-12-26 NOTE — NURSING NOTE
Patient did not worh with therapy. He stated that he was still having spasms. They stated that they would try tomorrow.

## 2023-12-26 NOTE — CARE PLAN
The patient's goals for the shift include pain managment    The clinical goals for the shift include pain mangement      Problem: Fall/Injury  Goal: Not fall by end of shift  12/26/2023 1812 by Afia Carpio RN  Outcome: Progressing  12/26/2023 0744 by Afia Carpio RN  Outcome: Progressing  Goal: Be free from injury by end of the shift  12/26/2023 1812 by Afia Carpio RN  Outcome: Progressing  12/26/2023 0744 by Afia Carpio RN  Outcome: Progressing  Goal: Verbalize understanding of personal risk factors for fall in the hospital  12/26/2023 1812 by Afia Carpio RN  Outcome: Progressing  12/26/2023 0744 by Afia Carpio RN  Outcome: Progressing  Goal: Verbalize understanding of risk factor reduction measures to prevent injury from fall in the home  12/26/2023 1812 by Afia Carpio RN  Outcome: Progressing  12/26/2023 0744 by Afia Carpio RN  Outcome: Progressing  Goal: Use assistive devices by end of the shift  12/26/2023 1812 by Afia Carpio RN  Outcome: Progressing  12/26/2023 0744 by Afia Carpio RN  Outcome: Progressing  Goal: Pace activities to prevent fatigue by end of the shift  12/26/2023 1812 by Afia Carpio RN  Outcome: Progressing  12/26/2023 0744 by Afia Carpio RN  Outcome: Progressing     Problem: Pain  Goal: Takes deep breaths with improved pain control throughout the shift  12/26/2023 1812 by Afia Carpio RN  Outcome: Progressing  12/26/2023 0744 by Afia Carpio RN  Outcome: Progressing  Goal: Turns in bed with improved pain control throughout the shift  12/26/2023 1812 by Afia Carpio RN  Outcome: Progressing  12/26/2023 0744 by Afia Carpio RN  Outcome: Progressing  Goal: Walks with improved pain control throughout the shift  12/26/2023 1812 by Afia Carpio RN  Outcome: Progressing  12/26/2023 0744 by Afia Carpio RN  Outcome: Progressing  Goal: Performs ADL's with improved pain control throughout shift  12/26/2023 1812 by Afia Carpio RN  Outcome:  Progressing  12/26/2023 0744 by Afia Carpio RN  Outcome: Progressing  Goal: Participates in PT with improved pain control throughout the shift  12/26/2023 1812 by Afia Carpio RN  Outcome: Progressing  12/26/2023 0744 by Afia Carpio RN  Outcome: Progressing  Goal: Free from opioid side effects throughout the shift  12/26/2023 1812 by Afia Carpio RN  Outcome: Progressing  12/26/2023 0744 by Afia Carpio RN  Outcome: Progressing  Goal: Free from acute confusion related to pain meds throughout the shift  12/26/2023 1812 by Afia Carpio RN  Outcome: Progressing  12/26/2023 0744 by Afia Carpio RN  Outcome: Progressing     Problem: Pain  Goal: STG - Patients pain is managed to allow active participation in daily activities with <4/10 pain  12/26/2023 1812 by Afia Carpio RN  Outcome: Progressing  12/26/2023 0744 by Afia Carpio RN  Outcome: Progressing     Problem: Skin  Goal: Participates in plan/prevention/treatment measures  12/26/2023 1812 by Afia Carpio RN  Outcome: Progressing  Flowsheets (Taken 12/26/2023 1812)  Participates in plan/prevention/treatment measures:   Discuss with provider PT/OT consult   Increase activity/out of bed for meals   Elevate heels  12/26/2023 0744 by Afia Carpio RN  Outcome: Progressing  Flowsheets (Taken 12/26/2023 0744)  Participates in plan/prevention/treatment measures:   Discuss with provider PT/OT consult   Increase activity/out of bed for meals   Elevate heels  Goal: Prevent/manage excess moisture  12/26/2023 1812 by Afia Carpio RN  Outcome: Progressing  Flowsheets (Taken 12/26/2023 1812)  Prevent/manage excess moisture:   Cleanse incontinence/protect with barrier cream   Moisturize dry skin   Use wicking fabric (obtain order)   Follow provider orders for dressing changes   Monitor for/manage infection if present  12/26/2023 0744 by Afia Carpio RN  Outcome: Progressing  Flowsheets (Taken 12/26/2023 0744)  Prevent/manage excess moisture:   Cleanse  incontinence/protect with barrier cream   Moisturize dry skin   Use wicking fabric (obtain order)   Follow provider orders for dressing changes   Monitor for/manage infection if present  Goal: Prevent/minimize sheer/friction injuries  12/26/2023 1812 by Afia Carpio RN  Outcome: Progressing  Flowsheets (Taken 12/26/2023 1812)  Prevent/minimize sheer/friction injuries:   Complete micro-shifts as needed if patient unable. Adjust patient position to relieve pressure points, not a full turn   HOB 30 degrees or less   Increase activity/out of bed for meals   Turn/reposition every 2 hours/use positioning/transfer devices   Use pull sheet   Utilize specialty bed per algorithm  12/26/2023 0744 by Afia Carpio RN  Outcome: Progressing  Flowsheets (Taken 12/26/2023 0744)  Prevent/minimize sheer/friction injuries:   Complete micro-shifts as needed if patient unable. Adjust patient position to relieve pressure points, not a full turn   HOB 30 degrees or less   Increase activity/out of bed for meals   Turn/reposition every 2 hours/use positioning/transfer devices   Use pull sheet   Utilize specialty bed per algorithm  Goal: Promote/optimize nutrition  12/26/2023 1812 by Afia Carpio RN  Outcome: Progressing  Flowsheets (Taken 12/26/2023 1812)  Promote/optimize nutrition:   Assist with feeding   Discuss with provider if NPO > 2 days   Offer water/supplements/favorite foods   Consume > 50% meals/supplements   Monitor/record intake including meals   Reassess MST if dietician not consulted  12/26/2023 0744 by Afia Carpio RN  Outcome: Progressing  Flowsheets (Taken 12/26/2023 0744)  Promote/optimize nutrition:   Assist with feeding   Discuss with provider if NPO > 2 days   Offer water/supplements/favorite foods   Consume > 50% meals/supplements   Monitor/record intake including meals   Reassess MST if dietician not consulted  Goal: Promote skin healing  12/26/2023 1812 by Afia Carpio RN  Outcome: Progressing  Flowsheets  (Taken 12/26/2023 1812)  Promote skin healing:   Assess skin/pad under line(s)/device(s)   Ensure correct size (line/device) and apply per  instructions   Protective dressings over bony prominences   Rotate device position/do not position patient on device   Turn/reposition every 2 hours/use positioning/transfer devices  12/26/2023 0744 by Afia Carpio RN  Outcome: Progressing  Flowsheets (Taken 12/26/2023 0744)  Promote skin healing:   Assess skin/pad under line(s)/device(s)   Ensure correct size (line/device) and apply per  instructions   Protective dressings over bony prominences   Rotate device position/do not position patient on device   Turn/reposition every 2 hours/use positioning/transfer devices

## 2023-12-26 NOTE — NURSING NOTE
Assumed care of patient. Patient is laying in bed with complaints of pain. He states that his right side ribs are hurting along with his back He has been unable to get sleep or get comfortable. Will talk with the doctors regarding better pain management. Call light is in place will continue to monitor.

## 2023-12-26 NOTE — PROGRESS NOTES
Physical Therapy                 Therapy Communication Note    Patient Name: Skip Selby  MRN: [id number]  Today's Date: 12/26/2023     Discipline: Physical Therapy    Missed Visit Reason: Missed Visit Reason: Patient refused (pt reporting his pain has calmed down right now and he wants to try to get some rest. pt willing to work with therapy tomorrow.)    Missed Time: Attempt

## 2023-12-26 NOTE — PROGRESS NOTES
Occupational Therapy                 Therapy Communication Note    Patient Name: Skip Selby  MRN: [id number]  Today's Date: 12/26/2023     Discipline: Occupational Therapy    Missed Visit Reason: Missed Visit Reason: Patient refused (pt reporting his pain has calmed down right now and he wants to try to get some rest. pt willing to work with therapy tomorrow.)    Missed Time: Attempt    Comment:

## 2023-12-26 NOTE — PROGRESS NOTES
Physical Therapy                 Therapy Communication Note    Patient Name: Skip Selby  MRN: [id number]  Today's Date: 12/26/2023     Discipline: Physical Therapy    Missed Visit Reason: Missed Visit Reason: Patient refused (pt declined therapy at this time due to pain and excruciating muscle spasms)    Missed Time: Attempt

## 2023-12-26 NOTE — PROGRESS NOTES
Skip Selby is a 83 y.o. male on day 1 of admission presenting with Multiple rib fractures involving first rib.      Subjective   Having severe rib pain from muscle spasms.  Tolerating diet.   PT, OT is working with him.        Objective     Last Recorded Vitals  /78 (BP Location: Left arm, Patient Position: Lying)   Pulse 72   Temp 36.7 °C (98.1 °F) (Oral)   Resp 16   Wt 94.9 kg (209 lb 3.5 oz)   SpO2 94%   Intake/Output last 3 Shifts:    Intake/Output Summary (Last 24 hours) at 12/26/2023 1136  Last data filed at 12/26/2023 0300  Gross per 24 hour   Intake --   Output 1550 ml   Net -1550 ml       Admission Weight  Weight: 93.9 kg (207 lb) (12/25/23 0102)    Daily Weight  12/25/23 : 94.9 kg (209 lb 3.5 oz)    Image Results  CT chest wo IV contrast  Narrative: Interpreted By:  Parveen Spain,   STUDY:  CT CHEST WO IV CONTRAST; 12/25/2023 1:39 am      INDICATION:  Signs/Symptoms:fall, right posterior and lateral rib pain.      COMPARISON:  None      ACCESSION NUMBER(S):  [id number]      ORDERING CLINICIAN:  MARLON JUNG      TECHNIQUE:  Contiguous axial images of the thorax were obtained from the level of  the thoracic inlet through the lung bases. 100 ml of Omnipaque 350  was utilized.All CT examinations are performed with 1 or more of the  following dose reduction techniques: Automated exposure control,  adjustment of mA and/or kv according to patient's size, or use of  iterative reconstruction techniques.          FINDINGS:  The thyroid gland is only partially visualized and otherwise  unremarkable.      The heart size is top-normal.. Prominent coronary artery  calcifications. No pericardial effusion is identified. The aorta and  pulmonary arteries are normal in caliber.      There are no pathologically enlarged mediastinal, hilar, or axillary  lymph nodes are seen.      The trachea and mainstem bronchi are patent. 8 mm calcified granuloma  in the left upper lobe. No significant  consolidation. Mild dependent  changes. There is no evidence of pneumothorax. Trace right pleural  effusion. No left pleural effusion.      The visualized osseous structures show acute, single non segmented  right rib fractures at ribs 4, 5, 9, and 10. There also acute,  multiple segmental right rib fractures at ribs 6 and 7. Old healed  rib fractures of right ribs 3 and 4.      Limited images through the upper abdomen are unremarkable.      Impression: Acute right rib fractures at ribs 4, 5, 6, 7, 9, and 10.      Old healed right rib fractures of ribs 3 and 4.      Trace right pleural effusion.      Signed by: Parveen Spain 12/25/2023 9:12 AM  Dictation workstation:   [id number]      Physical Exam  Constitutional:       Appearance: Normal appearance.   HENT:      Head: Normocephalic and atraumatic.      Right Ear: Tympanic membrane normal.      Nose: Nose normal.      Mouth/Throat:      Mouth: Mucous membranes are moist.   Eyes:      Pupils: Pupils are equal, round, and reactive to light.   Cardiovascular:      Rate and Rhythm: Normal rate and regular rhythm.      Pulses: Normal pulses.   Pulmonary:      Effort: Pulmonary effort is normal.      Breath sounds: Normal breath sounds.      Comments: Tender bilateral ribs.   Abdominal:      General: Bowel sounds are normal. There is distension.      Palpations: Abdomen is soft.      Tenderness: There is no abdominal tenderness. There is no guarding.      Hernia: No hernia is present.   Genitourinary:     Rectum: Normal.   Musculoskeletal:         General: Normal range of motion.   Skin:     General: Skin is warm and dry.   Neurological:      General: No focal deficit present.      Mental Status: He is alert.   Psychiatric:         Mood and Affect: Mood normal.         Behavior: Behavior normal.         Relevant Results  Lab Results   Component Value Date    GLUCOSE 168 (H) 12/25/2023    CALCIUM 8.9 12/25/2023     12/25/2023    K 4.0 12/25/2023    CO2 23 (L)  12/25/2023     12/25/2023    BUN 28 (H) 12/25/2023    CREATININE 0.80 12/25/2023     Lab Results   Component Value Date    WBC 10.7 12/25/2023    HGB 14.9 12/25/2023    HCT 42.9 12/25/2023    MCV 91 12/25/2023     (L) 12/25/2023       Assessment/Plan                  Principal Problem:    Multiple rib fractures involving first rib    12/26/23: Multimodal pain regimen. Diet as tolerated. Colace. PT, OT, IS.      15 minutes spent in chart review, pt care and assessment.          Lucina Inman, APRN-CNP

## 2023-12-26 NOTE — NURSING NOTE
Therapy in to see patient. He was having bad spasms, and will try to work with therapy later this afternoon.

## 2023-12-26 NOTE — PROGRESS NOTES
"Skip Selby is a 83 y.o. male on day 1 of admission presenting with Multiple rib fractures involving first rib.    Subjective   HPI   Patient seen and examined, complains with spasm on his chest almost continuously barely can talk with just got baclofen OxyContin   Pt said that he could not work with therapy wants her to come back and work with him in the afternoon.  Patient denies any  shortness of breath in room air.  Patient tolerates well her diet with no nausea vomiting or abdominal pain.  No fever or chills.  No headache or dizziness.      Objective     Last Recorded Vitals  Blood pressure 140/76, pulse 72, temperature 36.4 °C (97.5 °F), temperature source Oral, resp. rate 12, height 1.702 m (5' 7\"), weight 94.9 kg (209 lb 3.5 oz), SpO2 95 %.      Intake/Output Summary (Last 24 hours) at 12/26/2023 0839  Last data filed at 12/26/2023 0300  Gross per 24 hour   Intake --   Output 1550 ml   Net -1550 ml         Physical Exam  Constitutional:       Appearance: Normal appearance.   HENT:      Nose: Nose normal.      Mouth/Throat:      Mouth: Mucous membranes are moist.   Eyes:      Extraocular Movements: Extraocular movements intact.      Pupils: Pupils are equal, round, and reactive to light.   Cardiovascular:      Rate and Rhythm: Normal rate and regular rhythm.   Pulmonary:      Effort: Pulmonary effort is normal.      Breath sounds: Normal breath sounds.   Abdominal:      General: Bowel sounds are normal.      Palpations: Abdomen is soft.   Musculoskeletal:         General: Tenderness present.      Cervical back: Normal range of motion and neck supple.      Comments: Ribs pain due to fall    Skin:     General: Skin is warm.   Neurological:      General: No focal deficit present.      Mental Status: He is alert and oriented to person, place, and time.   Psychiatric:         Mood and Affect: Mood normal.        Relevant Results    Lab Results   Component Value Date    WBC 10.7 12/25/2023    HGB 14.9 12/25/2023 "    HCT 42.9 12/25/2023    MCV 91 12/25/2023     (L) 12/25/2023       Lab Results   Component Value Date    GLUCOSE 168 (H) 12/25/2023    CALCIUM 8.9 12/25/2023     12/25/2023    K 4.0 12/25/2023    CO2 23 (L) 12/25/2023     12/25/2023    BUN 28 (H) 12/25/2023    CREATININE 0.80 12/25/2023       Lab Results   Component Value Date    HGBA1C 5.5 08/27/2021       No results found.  Scheduled medications  amLODIPine, 2.5 mg, oral, q24h  apixaban, 5 mg, oral, q12h  aspirin, 81 mg, oral, Daily  baclofen, 10 mg, oral, TID  losartan, 100 mg, oral, q24h  metoprolol succinate XL, 25 mg, oral, Daily  oxyCODONE ER, 10 mg, oral, q12h HEATHER  polyethylene glycol, 17 g, oral, Daily  rosuvastatin, 10 mg, oral, Nightly      Continuous medications     PRN medications  PRN medications: oxyCODONE-acetaminophen, oxyCODONE-acetaminophen    Assessment/Plan   Principal Problem:  Multiple rib fractures involving first rib  CT chest shows multiple right-sided rib fractures.  No pneumothorax no pleural effusion   Keep pain under control   Order binder  PT/OT     Coronary artery disease, Hyperlipidemia,   No chest pain or shortness of breath  Continue with same home meds statin , aspirin Eliquis and beta-blocker  On Tele     Hypertension   BP is slightly elevated   Continue with the same regiment    DVT prophylaxis   On heparin     Discharge plan:  Discussed with the surgery team  Discussed with surgery team will see him today and put him on Robaxin and Narcan   Anticipate discharging patient home with home health care vs skilled of nursing when medically clear    I spent 25 minutes in the professional and overall care of this patient.    Eufemia Marti, APRN-CNP

## 2023-12-26 NOTE — CARE PLAN
The patient's goals for the shift include No fall    The clinical goals for the shift include pain control, fall precautions, comfort and rest

## 2023-12-27 LAB
ANION GAP SERPL CALC-SCNC: 11 MMOL/L
BUN SERPL-MCNC: 25 MG/DL (ref 8–25)
CALCIUM SERPL-MCNC: 8.7 MG/DL (ref 8.5–10.4)
CHLORIDE SERPL-SCNC: 100 MMOL/L (ref 97–107)
CO2 SERPL-SCNC: 23 MMOL/L (ref 24–31)
CREAT SERPL-MCNC: 0.8 MG/DL (ref 0.4–1.6)
ERYTHROCYTE [DISTWIDTH] IN BLOOD BY AUTOMATED COUNT: 12.7 % (ref 11.5–14.5)
GFR SERPL CREATININE-BSD FRML MDRD: 88 ML/MIN/1.73M*2
GLUCOSE SERPL-MCNC: 159 MG/DL (ref 65–99)
HCT VFR BLD AUTO: 44.2 % (ref 41–52)
HGB BLD-MCNC: 15.3 G/DL (ref 13.5–17.5)
MCH RBC QN AUTO: 31.8 PG (ref 26–34)
MCHC RBC AUTO-ENTMCNC: 34.6 G/DL (ref 32–36)
MCV RBC AUTO: 92 FL (ref 80–100)
NRBC BLD-RTO: 0 /100 WBCS (ref 0–0)
PLATELET # BLD AUTO: 135 X10*3/UL (ref 150–450)
POTASSIUM SERPL-SCNC: 3.8 MMOL/L (ref 3.4–5.1)
RBC # BLD AUTO: 4.81 X10*6/UL (ref 4.5–5.9)
SODIUM SERPL-SCNC: 134 MMOL/L (ref 133–145)
WBC # BLD AUTO: 9.2 X10*3/UL (ref 4.4–11.3)

## 2023-12-27 PROCEDURE — 94760 N-INVAS EAR/PLS OXIMETRY 1: CPT

## 2023-12-27 PROCEDURE — 9420000001 HC RT PATIENT EDUCATION 5 MIN

## 2023-12-27 PROCEDURE — 2500000001 HC RX 250 WO HCPCS SELF ADMINISTERED DRUGS (ALT 637 FOR MEDICARE OP)

## 2023-12-27 PROCEDURE — 97166 OT EVAL MOD COMPLEX 45 MIN: CPT | Mod: GO

## 2023-12-27 PROCEDURE — 94640 AIRWAY INHALATION TREATMENT: CPT

## 2023-12-27 PROCEDURE — 2500000001 HC RX 250 WO HCPCS SELF ADMINISTERED DRUGS (ALT 637 FOR MEDICARE OP): Performed by: INTERNAL MEDICINE

## 2023-12-27 PROCEDURE — 97530 THERAPEUTIC ACTIVITIES: CPT | Mod: GP

## 2023-12-27 PROCEDURE — 1100000001 HC PRIVATE ROOM DAILY

## 2023-12-27 PROCEDURE — 97530 THERAPEUTIC ACTIVITIES: CPT | Mod: GO

## 2023-12-27 PROCEDURE — 2500000005 HC RX 250 GENERAL PHARMACY W/O HCPCS

## 2023-12-27 PROCEDURE — 2500000004 HC RX 250 GENERAL PHARMACY W/ HCPCS (ALT 636 FOR OP/ED): Performed by: NURSE PRACTITIONER

## 2023-12-27 PROCEDURE — 85027 COMPLETE CBC AUTOMATED: CPT | Performed by: NURSE PRACTITIONER

## 2023-12-27 PROCEDURE — 2500000002 HC RX 250 W HCPCS SELF ADMINISTERED DRUGS (ALT 637 FOR MEDICARE OP, ALT 636 FOR OP/ED): Performed by: NURSE PRACTITIONER

## 2023-12-27 PROCEDURE — 36415 COLL VENOUS BLD VENIPUNCTURE: CPT | Performed by: NURSE PRACTITIONER

## 2023-12-27 PROCEDURE — 94664 DEMO&/EVAL PT USE INHALER: CPT

## 2023-12-27 PROCEDURE — 2500000001 HC RX 250 WO HCPCS SELF ADMINISTERED DRUGS (ALT 637 FOR MEDICARE OP): Performed by: NURSE PRACTITIONER

## 2023-12-27 PROCEDURE — 80048 BASIC METABOLIC PNL TOTAL CA: CPT | Performed by: NURSE PRACTITIONER

## 2023-12-27 PROCEDURE — 2500000004 HC RX 250 GENERAL PHARMACY W/ HCPCS (ALT 636 FOR OP/ED): Performed by: INTERNAL MEDICINE

## 2023-12-27 RX ORDER — ONDANSETRON HYDROCHLORIDE 2 MG/ML
4 INJECTION, SOLUTION INTRAVENOUS EVERY 8 HOURS PRN
Status: DISCONTINUED | OUTPATIENT
Start: 2023-12-27 | End: 2023-12-29 | Stop reason: HOSPADM

## 2023-12-27 RX ORDER — IPRATROPIUM BROMIDE AND ALBUTEROL SULFATE 2.5; .5 MG/3ML; MG/3ML
3 SOLUTION RESPIRATORY (INHALATION)
Status: DISCONTINUED | OUTPATIENT
Start: 2023-12-27 | End: 2023-12-27

## 2023-12-27 RX ORDER — IPRATROPIUM BROMIDE AND ALBUTEROL SULFATE 2.5; .5 MG/3ML; MG/3ML
3 SOLUTION RESPIRATORY (INHALATION) EVERY 2 HOUR PRN
Status: DISCONTINUED | OUTPATIENT
Start: 2023-12-27 | End: 2023-12-29 | Stop reason: HOSPADM

## 2023-12-27 RX ORDER — LIDOCAINE 560 MG/1
1 PATCH PERCUTANEOUS; TOPICAL; TRANSDERMAL DAILY
Start: 2023-12-28 | End: 2024-01-17

## 2023-12-27 RX ORDER — PANTOPRAZOLE SODIUM 40 MG/1
40 TABLET, DELAYED RELEASE ORAL DAILY
Status: DISCONTINUED | OUTPATIENT
Start: 2023-12-27 | End: 2023-12-29 | Stop reason: HOSPADM

## 2023-12-27 RX ORDER — BACLOFEN 10 MG/1
10 TABLET ORAL 3 TIMES DAILY
Start: 2023-12-27 | End: 2024-01-18 | Stop reason: SDUPTHER

## 2023-12-27 RX ORDER — OXYCODONE AND ACETAMINOPHEN 5; 325 MG/1; MG/1
1 TABLET ORAL EVERY 6 HOURS PRN
Qty: 5 TABLET | Refills: 0 | Status: SHIPPED | OUTPATIENT
Start: 2023-12-27 | End: 2023-12-29 | Stop reason: SDUPTHER

## 2023-12-27 RX ADMIN — PANTOPRAZOLE SODIUM 40 MG: 40 TABLET, DELAYED RELEASE ORAL at 16:58

## 2023-12-27 RX ADMIN — METOPROLOL SUCCINATE 25 MG: 25 TABLET, EXTENDED RELEASE ORAL at 08:25

## 2023-12-27 RX ADMIN — IPRATROPIUM BROMIDE AND ALBUTEROL SULFATE 3 ML: 2.5; .5 SOLUTION RESPIRATORY (INHALATION) at 21:00

## 2023-12-27 RX ADMIN — POLYETHYLENE GLYCOL 3350 17 G: 17 POWDER, FOR SOLUTION ORAL at 08:25

## 2023-12-27 RX ADMIN — ACETAMINOPHEN 650 MG: 325 TABLET ORAL at 11:18

## 2023-12-27 RX ADMIN — OXYCODONE HYDROCHLORIDE 10 MG: 10 TABLET, FILM COATED, EXTENDED RELEASE ORAL at 08:26

## 2023-12-27 RX ADMIN — ACETAMINOPHEN 650 MG: 325 TABLET ORAL at 16:58

## 2023-12-27 RX ADMIN — AMLODIPINE BESYLATE 2.5 MG: 2.5 TABLET ORAL at 05:00

## 2023-12-27 RX ADMIN — APIXABAN 5 MG: 5 TABLET, FILM COATED ORAL at 08:25

## 2023-12-27 RX ADMIN — METHOCARBAMOL TABLETS 500 MG: 500 TABLET, COATED ORAL at 13:44

## 2023-12-27 RX ADMIN — BACLOFEN 10 MG: 10 TABLET ORAL at 21:19

## 2023-12-27 RX ADMIN — ACETAMINOPHEN 650 MG: 325 TABLET ORAL at 05:00

## 2023-12-27 RX ADMIN — BACLOFEN 10 MG: 10 TABLET ORAL at 08:25

## 2023-12-27 RX ADMIN — LIDOCAINE 1 PATCH: 4 PATCH TOPICAL at 08:26

## 2023-12-27 RX ADMIN — APIXABAN 5 MG: 5 TABLET, FILM COATED ORAL at 21:19

## 2023-12-27 RX ADMIN — OXYCODONE HYDROCHLORIDE 10 MG: 10 TABLET, FILM COATED, EXTENDED RELEASE ORAL at 21:19

## 2023-12-27 RX ADMIN — ASPIRIN 81 MG: 81 TABLET, COATED ORAL at 08:25

## 2023-12-27 RX ADMIN — OXYCODONE AND ACETAMINOPHEN 2 TABLET: 5; 325 TABLET ORAL at 23:27

## 2023-12-27 RX ADMIN — OXYCODONE AND ACETAMINOPHEN 2 TABLET: 5; 325 TABLET ORAL at 01:10

## 2023-12-27 RX ADMIN — ONDANSETRON 4 MG: 2 INJECTION INTRAMUSCULAR; INTRAVENOUS at 16:59

## 2023-12-27 RX ADMIN — ROSUVASTATIN CALCIUM 10 MG: 10 TABLET, FILM COATED ORAL at 21:19

## 2023-12-27 RX ADMIN — METHOCARBAMOL TABLETS 500 MG: 500 TABLET, COATED ORAL at 21:19

## 2023-12-27 RX ADMIN — METHOCARBAMOL TABLETS 500 MG: 500 TABLET, COATED ORAL at 05:00

## 2023-12-27 RX ADMIN — LOSARTAN POTASSIUM 100 MG: 100 TABLET, FILM COATED ORAL at 05:00

## 2023-12-27 RX ADMIN — ACETAMINOPHEN 325 MG: 325 TABLET ORAL at 23:28

## 2023-12-27 RX ADMIN — OXYCODONE AND ACETAMINOPHEN 1 TABLET: 5; 325 TABLET ORAL at 13:44

## 2023-12-27 RX ADMIN — BACLOFEN 10 MG: 10 TABLET ORAL at 13:44

## 2023-12-27 ASSESSMENT — COGNITIVE AND FUNCTIONAL STATUS - GENERAL
CLIMB 3 TO 5 STEPS WITH RAILING: TOTAL
MOVING FROM LYING ON BACK TO SITTING ON SIDE OF FLAT BED WITH BEDRAILS: TOTAL
MOVING TO AND FROM BED TO CHAIR: A LOT
DRESSING REGULAR LOWER BODY CLOTHING: A LOT
PERSONAL GROOMING: A LITTLE
HELP NEEDED FOR BATHING: A LOT
DAILY ACTIVITIY SCORE: 15
TOILETING: A LOT
TURNING FROM BACK TO SIDE WHILE IN FLAT BAD: TOTAL
MOBILITY SCORE: 9
DRESSING REGULAR UPPER BODY CLOTHING: A LOT
STANDING UP FROM CHAIR USING ARMS: A LOT
WALKING IN HOSPITAL ROOM: A LOT

## 2023-12-27 ASSESSMENT — PAIN - FUNCTIONAL ASSESSMENT
PAIN_FUNCTIONAL_ASSESSMENT: FLACC (FACE, LEGS, ACTIVITY, CRY, CONSOLABILITY)
PAIN_FUNCTIONAL_ASSESSMENT: 0-10

## 2023-12-27 ASSESSMENT — PAIN SCALES - GENERAL
PAINLEVEL_OUTOF10: 8
PAINLEVEL_OUTOF10: 2
PAINLEVEL_OUTOF10: 0 - NO PAIN
PAINLEVEL_OUTOF10: 6

## 2023-12-27 ASSESSMENT — PAIN DESCRIPTION - DESCRIPTORS: DESCRIPTORS: SPASM

## 2023-12-27 ASSESSMENT — PAIN DESCRIPTION - LOCATION: LOCATION: BACK

## 2023-12-27 ASSESSMENT — PAIN DESCRIPTION - ORIENTATION: ORIENTATION: LOWER

## 2023-12-27 ASSESSMENT — ACTIVITIES OF DAILY LIVING (ADL)
ADL_ASSISTANCE: INDEPENDENT
LACK_OF_TRANSPORTATION: NO
BATHING_ASSISTANCE: MODERATE

## 2023-12-27 NOTE — PROGRESS NOTES
Occupational Therapy                 Therapy Communication Note    Patient Name: Skip Selby  MRN: [id number]  Today's Date: 12/27/2023     Discipline: Occupational Therapy    Missed Visit Reason: Missed Visit Reason: Patient refused (OT eval received and chart reviewed. Pt politely requesting therapy return at a later time secondary to pain)    Missed Time: Attempt

## 2023-12-27 NOTE — PROGRESS NOTES
Spiritual Care Visit    Clinical Encounter Type  Visited With: Patient  Routine Visit: Introduction  Continue Visiting: Yes         Values/Beliefs  Spiritual Requests During Hospitalization: Anointed today    Sacramental Encounters  Sacrament of Sick-Anointing: Anointed  Darwin Osman

## 2023-12-27 NOTE — CARE PLAN
The patient's goals for the shift include pain managment    The clinical goals for the shift include pain control, rest

## 2023-12-27 NOTE — PROGRESS NOTES
"Skip Selby is a 83 y.o. male on day 2 of admission presenting with Multiple rib fractures involving first rib.    Subjective   HPI   Patient seen and examined, feels better today able to sleep less spasm pt said that he could not work with therapy wants her to come back  in the afternoon.  Patient denies any  shortness of breath in room air.  Patient tolerates well her diet with no nausea vomiting or abdominal pain.  No fever or chills.      Objective     Last Recorded Vitals  Blood pressure 154/76, pulse 78, temperature 36.5 °C (97.7 °F), temperature source Oral, resp. rate 14, height 1.702 m (5' 7\"), weight 94.9 kg (209 lb 3.5 oz), SpO2 95 %.      Intake/Output Summary (Last 24 hours) at 12/27/2023 0755  Last data filed at 12/26/2023 1830  Gross per 24 hour   Intake 300 ml   Output 300 ml   Net 0 ml           Physical Exam  Constitutional:       Appearance: Normal appearance.   HENT:      Nose: Nose normal.      Mouth/Throat:      Mouth: Mucous membranes are moist.   Eyes:      Extraocular Movements: Extraocular movements intact.      Pupils: Pupils are equal, round, and reactive to light.   Cardiovascular:      Rate and Rhythm: Normal rate and regular rhythm.   Pulmonary:      Effort: Pulmonary effort is normal.      Breath sounds: Normal breath sounds.   Abdominal:      General: Bowel sounds are normal.      Palpations: Abdomen is soft.   Musculoskeletal:         General: Tenderness present.      Cervical back: Normal range of motion and neck supple.      Comments: Ribs pain due to fall    Skin:     General: Skin is warm.   Neurological:      General: No focal deficit present.      Mental Status: He is alert and oriented to person, place, and time.   Psychiatric:         Mood and Affect: Mood normal.        Relevant Results    Lab Results   Component Value Date    WBC 10.7 12/25/2023    HGB 14.9 12/25/2023    HCT 42.9 12/25/2023    MCV 91 12/25/2023     (L) 12/25/2023       Lab Results   Component " Value Date    GLUCOSE 168 (H) 12/25/2023    CALCIUM 8.9 12/25/2023     12/25/2023    K 4.0 12/25/2023    CO2 23 (L) 12/25/2023     12/25/2023    BUN 28 (H) 12/25/2023    CREATININE 0.80 12/25/2023       Lab Results   Component Value Date    HGBA1C 5.5 08/27/2021       No results found.  Scheduled medications  acetaminophen, 650 mg, oral, q6h  amLODIPine, 2.5 mg, oral, q24h  apixaban, 5 mg, oral, q12h  aspirin, 81 mg, oral, Daily  baclofen, 10 mg, oral, TID  lidocaine, 1 patch, transdermal, Daily  losartan, 100 mg, oral, q24h  methocarbamol, 500 mg, oral, q8h HEATHER  metoprolol succinate XL, 25 mg, oral, Daily  oxyCODONE ER, 10 mg, oral, q12h HEATHER  polyethylene glycol, 17 g, oral, Daily  rosuvastatin, 10 mg, oral, Nightly      Continuous medications     PRN medications  PRN medications: naloxone, oxyCODONE-acetaminophen, oxyCODONE-acetaminophen    Assessment/Plan   Principal Problem:  Multiple rib fractures involving first rib/ ribs pain   CT chest shows multiple right-sided rib fractures.  No pneumothorax no pleural effusion   Keep pain under control   Keep binder on   PT/OT recommend SNF    Nausea/GERD  And complaining of nausea order Zofran as needed  Start him on pantoprazole f    Coronary artery disease, Hyperlipidemia,   No chest pain or shortness of breath  Continue with same home meds statin , aspirin Eliquis and beta-blocker  On Tele     Hypertension   BP is slightly elevated   Continue with the same regiment    DVT prophylaxis   On heparin     Discharge plan:  Pt s wife at the bedside answer to all her question and concerns.  She choosed  the nursing home that his insurance can accept     Anticipate discharging patient to skilled of nursing   I spent 25 minutes in the professional and overall care of this patient.    Eufemia Marti, APRN-CNP

## 2023-12-27 NOTE — NURSING NOTE
There is a note in pts chart that they need signed by a physician, to excuse them of the expenses from a cruise that they had to cancel due to injury.

## 2023-12-27 NOTE — PROGRESS NOTES
Physical Therapy                 Therapy Communication Note    Patient Name: Skip Selby  MRN: [id number]  Today's Date: 12/27/2023     Discipline: Physical Therapy    Missed Visit Reason: Missed Visit Reason: Patient refused (reports he would like another dose of pain medication prior to mobility)    Missed Time: Attempt

## 2023-12-27 NOTE — NURSING NOTE
Assumed care of pt, BSSR complete. Pt is resting in bed with call light in reach repositioned for comfort. Pt is AxOx3

## 2023-12-27 NOTE — PROGRESS NOTES
12/27/23 1212   Current Planned Discharge Disposition   Current Planned Discharge Disposition SNF

## 2023-12-27 NOTE — PROGRESS NOTES
TCC met with patient and spouse at the bedside. Spouse Belen [phone] gave choices for SNF 1. [facility] 4. [facility]. Sent referral via careport. Waiting for acceptance and then will need a precert. TCC will continue to follow and assist in dc planning.    PLAN AT THE TIME OF DISCHARGE IS TO GO TO SNF, PENDING ACCEPTANCE AND WILL NEED PRECERT.     PATIENT DOES NOT HAVE A SECURE DISCHARGE PLAN.     MD WILL NEED TO SIGN /CERTIFY [facility] PRIOR TO DISCHARGE.     1600 UPDATE: Spoke to wife about choices that accepted. She chose [facility]. Requested precert to be started by Roxie Kirby and Cata Yu.      12/27/23 1212   Discharge Planning   Living Arrangements Spouse/significant other   Support Systems Spouse/significant other   Assistance Needed ambulating, adl's   Type of Residence Private residence   Who is requesting discharge planning? Provider   Home or Post Acute Services Post acute facilities (Rehab/SNF/etc)   Type of Post Acute Facility Services Rehab   Patient expects to be discharged to: SNF, sent referrals   Financial Resource Strain   How hard is it for you to pay for the very basics like food, housing, medical care, and heating? Not very   Housing Stability   In the last 12 months, was there a time when you were not able to pay the mortgage or rent on time? N   In the last 12 months, was there a time when you did not have a steady place to sleep or slept in a shelter (including now)? N   Transportation Needs   In the past 12 months, has lack of transportation kept you from medical appointments or from getting medications? no   In the past 12 months, has lack of transportation kept you from meetings, work, or from getting things needed for daily living? No   Patient Choice   Provider Choice list and CMS website (https://medicare.gov/care-compare#search) for post-acute Quality and Resource Measure Data were provided  and reviewed with: Patient;Family   Patient / Family choosing to utilize agency / facility established prior to hospitalization No

## 2023-12-27 NOTE — PROGRESS NOTES
Occupational Therapy    Evaluation/Treatment    Patient Name: Skip Selby  MRN: [id number]  : 1940  Today's Date: 23  Time Calculation  Start Time:   Stop Time: 9  Time Calculation (min): 35 min       Assessment:  OT Assessment: Pt would benefit from acute OT services  Prognosis: Good  End of Session Communication: Bedside nurse  End of Session Patient Position: Up in chair (all needs in reach, RN aware)  OT Assessment Results: Decreased ADL status, Decreased upper extremity strength, Decreased endurance, Decreased functional mobility  Prognosis: Good  Strengths: Ability to acquire knowledge, Premorbid level of function, Support of extended family/friends  Plan:  Treatment Interventions: ADL retraining, Functional transfer training, Endurance training, Equipment evaluation/education, Patient/family training  OT Frequency: 4 times per week  OT Discharge Recommendations: Moderate intensity level of continued care  OT - OK to Discharge: Yes  Treatment Interventions: ADL retraining, Functional transfer training, Endurance training, Equipment evaluation/education, Patient/family training    Subjective     General:   OT Received On: 23  General  Reason for Referral: Impaired ADLs; pt admitted from home after sustaining a fall resulting in Right rib fx 4-10  Past Medical History Relevant to Rehab: CAD, HTN, Hyperlipidemia  Missed Visit: No  Missed Visit Reason: Other (Comment)  Family/Caregiver Present: No  Co-Treatment: PT  Co-Treatment Reason: partial  Prior to Session Communication: Bedside nurse  Patient Position Received: Bed, 3 rail up, Alarm off, not on at start of session  General Comment: Cleared by nursing. Pt agreeable to therapy with encouragement  Precautions:  Medical Precautions: Fall precautions     Pain:  Pain Assessment  Pain Assessment: 0-10  Pain Score:  (0/10 at rest; FLACC 6 with movement right ribs)    Objective   Cognition:  Overall Cognitive Status: Within Functional  Limits           Home Living:  Type of Home: House  Lives With: Spouse  Home Adaptive Equipment: None  Home Layout: Two level, Bed/bath upstairs, Full bath main level, Able to live on main level with bedroom/bathroom  Home Access: Stairs to enter with rails  Entrance Stairs-Rails:  (unilateral)  Entrance Stairs-Number of Steps: 3  Bathroom Shower/Tub: Walk-in tub, Walk-in shower  Bathroom Toilet: Standard  Bathroom Equipment: None  Prior Function:  Level of [place]: Independent with homemaking with ambulation, Independent with ADLs and functional transfers  Receives Help From: Family  ADL Assistance: Independent  Homemaking Assistance: Independent  Ambulatory Assistance: Independent  Vocational: Retired  Leisure:  (Hiking)  Hand Dominance: Right  Prior Function Comments: Pt reports active and indep PTA     ADL:  Eating Assistance: Independent  Grooming Assistance: Minimal  Bathing Assistance: Moderate  UE Dressing Assistance: Minimal  LE Dressing Assistance: Moderate  Toileting Assistance with Device: Moderate  Activities of Daily Living:    LE Dressing  LE Dressing: Yes  Sock Level of Assistance: Maximum assistance  LE Dressing Where Assessed: Bed level  LE Dressing Comments: donning socks       Activity Tolerance:  Endurance: Decreased tolerance for upright activites  Static Sitting Balance:  Close supervision for safety sitting EOB ~10-12 minutes. Upon sitting EOB blisters noted on left side of back, RN notified      Bed Mobility/Transfers: Bed Mobility  Bed Mobility:  (max A trunk upright supine>seated EOB with HOB elevated and use of bed rail, pt able to advance BLE off bed; increased time and effort to perform with rest breaks secondary to pain)    Transfers  Transfer:  (mod A for balance and controlled descent sit<>stand bed and chair level; VCs for safe hand and leg placement)       Therapy/Activity: Therapeutic Activity  Therapeutic Activity Performed:  (mod A for balance for functional mobility ~3-4  steps bed>chair with arm in arm assist, pt with slow movements this date)     Vision:Vision - Basic Assessment  Current Vision: Wears glasses all the time  Sensation:  Sensation Comment: pt denied numbness/paresthesia BUE  Strength:  Strength Comments: NT formally secondary to rib fxs/pain      Hand Function:  Hand Function  Gross Grasp: Functional  Coordination: Functional  Extremities: RUE   RUE : Within Functional Limits and LUE   LUE: Within Functional Limits    Outcome Measures: [facility] Daily Activity  Putting on and taking off regular lower body clothing: A lot  Bathing (including washing, rinsing, drying): A lot  Putting on and taking off regular upper body clothing: A lot  Toileting, which includes using toilet, bedpan or urinal: A lot  Taking care of personal grooming such as brushing teeth: A little  Eating Meals: None  Daily Activity - Total Score: 15    Education Documentation  ADL Training, taught by Danita Malin OT at 12/27/2023  3:28 PM.  Learner: Patient  Readiness: Acceptance  Method: Explanation, Demonstration  Response: Verbalizes Understanding, Needs Reinforcement    Education Comments  No comments found.      Goals:  Encounter Problems       Encounter Problems (Active)       ADLs       Pt will complete ADL tasks at mod I with use of AE prn  (Progressing)       Start:  12/27/23    Expected End:  01/17/24               Mobility       Pt will perform functional mobility household distances at mod I with use of LRD.    (Progressing)       Start:  12/27/23    Expected End:  01/17/24               Transfers       Pt will perform functional transfers at mod I. (Progressing)       Start:  12/27/23    Expected End:  01/17/24

## 2023-12-27 NOTE — PROGRESS NOTES
Physical Therapy    Physical Therapy Treatment    Patient Name: Skip Selby  MRN: [id number]  Today's Date: 12/27/2023  Time Calculation  Start Time: 1445  Stop Time: 1502  Time Calculation (min): 17 min    Assessment/Plan   PT Assessment  End of Session Communication: Bedside nurse  Assessment Comment: pt requires a significant amount of time to complete all activites. pt is very guarded and requires mod A x 1-2 for safe mobility. pt will benefit form continued skilled therapy services to improve functional performance and maxmimze safety.  End of Session Patient Position: Up in chair, Alarm off, not on at start of session     PT Plan  Treatment/Interventions: Bed mobility, Transfer training, Gait training, Stair training, Neuromuscular re-education, Strengthening, Endurance training, Range of motion, Therapeutic exercise, Therapeutic activity, Home exercise program, Positioning, Postural re-education  PT Plan: Skilled PT  PT Frequency: 4 times per week  PT Discharge Recommendations: Moderate intensity level of continued care  Equipment Recommended upon Discharge: Wheeled walker  PT Recommended Transfer Status: Assist x2  PT - OK to Discharge: Yes    General Visit Information:   PT  Visit  PT Received On: 12/27/23  Response to Previous Treatment: Other (Comment) (reports pain and muscle spasms have decreased)  General  Missed Visit: Yes  Missed Visit Reason: Patient refused (reports he would like another dose of pain medication prior to mobility)  Family/Caregiver Present: No  Co-Treatment: OT  Co-Treatment Reason: partial co-treat during mobility portion due to pt's low tolerance to pain and safety of caregivers and patient.  Prior to Session Communication: Bedside nurse  Patient Position Received: Bed, 2 rail up, Alarm off, not on at start of session  General Comment: pt agreeable to therapy and mobility at this time.    Subjective   Precautions:  Precautions  Hearing/Visual Limitations: wears glasses  Medical  Precautions: Fall precautions    Objective   Pain:  Pain Assessment  Pain Assessment: FLACC (Face, Legs, Activity, Cry, Consolability)  Pain Score: 8  Pain Type: Acute pain  Pain Location: Rib cage  Pain Orientation: Right  Pain Descriptors: Spasm  Clinical Progression: Other (Comment) (intermittent)  Pain Interventions: Repositioned  Response to Interventions: no spasms while pt sitting in chair  Cognition:  Cognition  Overall Cognitive Status: Within Functional Limits  Postural Control:  Postural Control  Posture Comment: pt attempting to keep an erect, upright posture  Activity Tolerance:  Activity Tolerance  Endurance: Decreased tolerance for upright activites  Activity Tolerance Comments: fair-, limited by pain and muscle spasms  Treatments:  Therapeutic Exercise  Therapeutic Exercise Performed:  (deferred due to pain from moving)    Therapeutic Activity  Therapeutic Activity Performed:  (pt able to use arm rests on chair to help scoot his hips back.)    Balance/Neuromuscular Re-Education  Balance/Neuromuscular Re-Education Activity Performed:  (Intermittent CGA for static sitting balance while seated at EOB, B toes touching the floor, R hand in lap, left hand on bed supporting trunk.  min A for static standing balance at the bedside, wide SAGRARIO.)    Bed Mobility  Bed Mobility:  (significantly increased time to complete transfer to sitting on EOB, extensive rest periods between each movement. HOB elevated, strong use of bed rail. pt eventually able to move B LEs off EOB, max A to elevate trunk.)    Ambulation/Gait Training  Ambulation/Gait Training Performed:  (Amb 6 short, shuffled steps to the chair, wide SAGRARIO, decreased weight shifting, unsteady with mod A x 1-2 for balance support.)    Transfers  Transfer:  (mod A x 1-2 to stand, tactile cues for hip extension, mod A to guide trunk up and establish COG over SAGRARIO.  Increased time needed to sit in chair, verbal cues to reach left hand back for arm of chair, mod A  for eccentric control.)    Stairs  Stairs: No    Outcome Measures:  [facility] Basic Mobility  Turning from your back to your side while in a flat bed without using bedrails: Total  Moving from lying on your back to sitting on the side of a flat bed without using bedrails: Total  Moving to and from bed to chair (including a wheelchair): A lot  Standing up from a chair using your arms (e.g. wheelchair or bedside chair): A lot  To walk in hospital room: A lot  Climbing 3-5 steps with railing: Total  Basic Mobility - Total Score: 9    IP EDUCATION:  Individual(s) Educated: Patient  Education Provided: Body Mechanics, Fall Risk, Posture  Risk and Benefits Discussed with Patient/Caregiver/Other: yes  Patient/Caregiver Demonstrated Understanding: yes  Plan of Care Discussed and Agreed Upon: yes  Patient Response to Education: Patient/Caregiver Verbalized Understanding of Information    Encounter Problems       Encounter Problems (Active)       Mobility       STG - Patient will ambulate 50 feet with modified independent assist (Progressing)       Start:  12/25/23    Expected End:  01/08/24               Transfers       STG - Patient to transfer to and from sit to supine with modified independent  (Progressing)       Start:  12/25/23    Expected End:  01/08/24            STG - Patient will transfer sit to and from stand with modified independent (Progressing)       Start:  12/25/23    Expected End:  01/08/24

## 2023-12-28 PROCEDURE — 2500000005 HC RX 250 GENERAL PHARMACY W/O HCPCS

## 2023-12-28 PROCEDURE — 2500000001 HC RX 250 WO HCPCS SELF ADMINISTERED DRUGS (ALT 637 FOR MEDICARE OP): Performed by: INTERNAL MEDICINE

## 2023-12-28 PROCEDURE — 2500000001 HC RX 250 WO HCPCS SELF ADMINISTERED DRUGS (ALT 637 FOR MEDICARE OP): Performed by: NURSE PRACTITIONER

## 2023-12-28 PROCEDURE — 97116 GAIT TRAINING THERAPY: CPT | Mod: GP

## 2023-12-28 PROCEDURE — 2500000001 HC RX 250 WO HCPCS SELF ADMINISTERED DRUGS (ALT 637 FOR MEDICARE OP)

## 2023-12-28 PROCEDURE — 2500000004 HC RX 250 GENERAL PHARMACY W/ HCPCS (ALT 636 FOR OP/ED): Performed by: NURSE PRACTITIONER

## 2023-12-28 PROCEDURE — 2500000004 HC RX 250 GENERAL PHARMACY W/ HCPCS (ALT 636 FOR OP/ED): Performed by: INTERNAL MEDICINE

## 2023-12-28 PROCEDURE — 1100000001 HC PRIVATE ROOM DAILY

## 2023-12-28 PROCEDURE — 97530 THERAPEUTIC ACTIVITIES: CPT | Mod: GP

## 2023-12-28 PROCEDURE — 97530 THERAPEUTIC ACTIVITIES: CPT | Mod: GO

## 2023-12-28 RX ORDER — PANTOPRAZOLE SODIUM 40 MG/1
40 TABLET, DELAYED RELEASE ORAL DAILY
Start: 2023-12-29

## 2023-12-28 RX ORDER — OXYCODONE AND ACETAMINOPHEN 5; 325 MG/1; MG/1
2 TABLET ORAL EVERY 4 HOURS PRN
Qty: 5 TABLET | Refills: 0 | Status: SHIPPED | OUTPATIENT
Start: 2023-12-28 | End: 2023-12-29 | Stop reason: SDUPTHER

## 2023-12-28 RX ORDER — POLYETHYLENE GLYCOL 3350 17 G/17G
17 POWDER, FOR SOLUTION ORAL DAILY
Start: 2023-12-29 | End: 2024-03-13 | Stop reason: WASHOUT

## 2023-12-28 RX ORDER — METHOCARBAMOL 500 MG/1
500 TABLET, FILM COATED ORAL EVERY 8 HOURS SCHEDULED
Start: 2023-12-28 | End: 2024-01-18 | Stop reason: SDUPTHER

## 2023-12-28 RX ORDER — OXYCODONE HCL 10 MG/1
10 TABLET, FILM COATED, EXTENDED RELEASE ORAL EVERY 12 HOURS SCHEDULED
Qty: 6 TABLET | Refills: 0 | Status: SHIPPED | OUTPATIENT
Start: 2023-12-28 | End: 2023-12-29 | Stop reason: SDUPTHER

## 2023-12-28 RX ADMIN — POLYETHYLENE GLYCOL 3350 17 G: 17 POWDER, FOR SOLUTION ORAL at 09:41

## 2023-12-28 RX ADMIN — APIXABAN 5 MG: 5 TABLET, FILM COATED ORAL at 09:40

## 2023-12-28 RX ADMIN — METHOCARBAMOL TABLETS 500 MG: 500 TABLET, COATED ORAL at 21:31

## 2023-12-28 RX ADMIN — AMLODIPINE BESYLATE 2.5 MG: 2.5 TABLET ORAL at 05:09

## 2023-12-28 RX ADMIN — METHOCARBAMOL TABLETS 500 MG: 500 TABLET, COATED ORAL at 13:00

## 2023-12-28 RX ADMIN — APIXABAN 5 MG: 5 TABLET, FILM COATED ORAL at 21:31

## 2023-12-28 RX ADMIN — METOPROLOL SUCCINATE 25 MG: 25 TABLET, EXTENDED RELEASE ORAL at 09:40

## 2023-12-28 RX ADMIN — OXYCODONE HYDROCHLORIDE 10 MG: 10 TABLET, FILM COATED, EXTENDED RELEASE ORAL at 21:31

## 2023-12-28 RX ADMIN — PANTOPRAZOLE SODIUM 40 MG: 40 TABLET, DELAYED RELEASE ORAL at 09:40

## 2023-12-28 RX ADMIN — ACETAMINOPHEN 650 MG: 325 TABLET ORAL at 05:10

## 2023-12-28 RX ADMIN — ACETAMINOPHEN 650 MG: 325 TABLET ORAL at 12:56

## 2023-12-28 RX ADMIN — OXYCODONE HYDROCHLORIDE 10 MG: 10 TABLET, FILM COATED, EXTENDED RELEASE ORAL at 09:40

## 2023-12-28 RX ADMIN — METHOCARBAMOL TABLETS 500 MG: 500 TABLET, COATED ORAL at 05:09

## 2023-12-28 RX ADMIN — ASPIRIN 81 MG: 81 TABLET, COATED ORAL at 09:40

## 2023-12-28 RX ADMIN — BACLOFEN 10 MG: 10 TABLET ORAL at 15:02

## 2023-12-28 RX ADMIN — LIDOCAINE 1 PATCH: 4 PATCH TOPICAL at 09:41

## 2023-12-28 RX ADMIN — ROSUVASTATIN CALCIUM 10 MG: 10 TABLET, FILM COATED ORAL at 21:31

## 2023-12-28 RX ADMIN — BACLOFEN 10 MG: 10 TABLET ORAL at 09:40

## 2023-12-28 RX ADMIN — BACLOFEN 10 MG: 10 TABLET ORAL at 21:31

## 2023-12-28 RX ADMIN — LOSARTAN POTASSIUM 100 MG: 100 TABLET, FILM COATED ORAL at 05:10

## 2023-12-28 RX ADMIN — ACETAMINOPHEN 650 MG: 325 TABLET ORAL at 23:51

## 2023-12-28 RX ADMIN — OXYCODONE AND ACETAMINOPHEN 2 TABLET: 5; 325 TABLET ORAL at 17:20

## 2023-12-28 ASSESSMENT — PAIN - FUNCTIONAL ASSESSMENT
PAIN_FUNCTIONAL_ASSESSMENT: 0-10
PAIN_FUNCTIONAL_ASSESSMENT: 0-10
PAIN_FUNCTIONAL_ASSESSMENT: FLACC (FACE, LEGS, ACTIVITY, CRY, CONSOLABILITY)
PAIN_FUNCTIONAL_ASSESSMENT: 0-10
PAIN_FUNCTIONAL_ASSESSMENT: FLACC (FACE, LEGS, ACTIVITY, CRY, CONSOLABILITY)
PAIN_FUNCTIONAL_ASSESSMENT: WONG-BAKER FACES

## 2023-12-28 ASSESSMENT — COGNITIVE AND FUNCTIONAL STATUS - GENERAL
MOBILITY SCORE: 16
TURNING FROM BACK TO SIDE WHILE IN FLAT BAD: A LOT
STANDING UP FROM CHAIR USING ARMS: A LOT
MOBILITY SCORE: 12
DRESSING REGULAR LOWER BODY CLOTHING: A LOT
DAILY ACTIVITIY SCORE: 13
WALKING IN HOSPITAL ROOM: A LITTLE
TOILETING: A LOT
CLIMB 3 TO 5 STEPS WITH RAILING: A LOT
MOVING TO AND FROM BED TO CHAIR: A LOT
DAILY ACTIVITIY SCORE: 16
DRESSING REGULAR LOWER BODY CLOTHING: A LOT
PERSONAL GROOMING: A LOT
DRESSING REGULAR UPPER BODY CLOTHING: A LOT
TOILETING: A LOT
TURNING FROM BACK TO SIDE WHILE IN FLAT BAD: A LITTLE
CLIMB 3 TO 5 STEPS WITH RAILING: A LOT
MOBILITY SCORE: 13
DRESSING REGULAR LOWER BODY CLOTHING: A LOT
TOILETING: A LITTLE
EATING MEALS: A LITTLE
WALKING IN HOSPITAL ROOM: A LOT
STANDING UP FROM CHAIR USING ARMS: A LOT
MOVING FROM LYING ON BACK TO SITTING ON SIDE OF FLAT BED WITH BEDRAILS: A LITTLE
STANDING UP FROM CHAIR USING ARMS: A LOT
DRESSING REGULAR UPPER BODY CLOTHING: A LITTLE
MOVING TO AND FROM BED TO CHAIR: A LOT
HELP NEEDED FOR BATHING: A LOT
MOVING TO AND FROM BED TO CHAIR: A LOT
HELP NEEDED FOR BATHING: A LITTLE
PERSONAL GROOMING: A LITTLE
DRESSING REGULAR UPPER BODY CLOTHING: A LOT
CLIMB 3 TO 5 STEPS WITH RAILING: TOTAL
PERSONAL GROOMING: A LITTLE
TURNING FROM BACK TO SIDE WHILE IN FLAT BAD: TOTAL
HELP NEEDED FOR BATHING: A LOT
DAILY ACTIVITIY SCORE: 17
WALKING IN HOSPITAL ROOM: A LOT

## 2023-12-28 ASSESSMENT — PAIN SCALES - GENERAL
PAINLEVEL_OUTOF10: 1
PAINLEVEL_OUTOF10: 6
PAINLEVEL_OUTOF10: 10 - WORST POSSIBLE PAIN
PAINLEVEL_OUTOF10: 3
PAINLEVEL_OUTOF10: 0 - NO PAIN

## 2023-12-28 ASSESSMENT — ACTIVITIES OF DAILY LIVING (ADL): BATHING_COMMENTS: PT DECLINED

## 2023-12-28 ASSESSMENT — PAIN DESCRIPTION - DESCRIPTORS: DESCRIPTORS: SHOOTING;SHARP;SPASM

## 2023-12-28 NOTE — NURSING NOTE
BSSR complete. Pt is alert, lying in bed. Male purewick has dislodged. Pt requesting new one. This RN offered urinal, patient refused.

## 2023-12-28 NOTE — PROGRESS NOTES
Physical Therapy    Physical Therapy Treatment    Patient Name: Skip Selby  MRN: [id number]  Today's Date: 12/28/2023  Time Calculation  Start Time: 0915  Stop Time: 0945  Time Calculation (min): 30 min    Assessment/Plan   PT Assessment  End of Session Communication: Bedside nurse (CNP)  Assessment Comment: pt with slowly improving functional mobility, still limited by pain and spasms. pt able to ambulate a short distance with a rolling walker. pt very guarded and requires constant cues for safety.  End of Session Patient Position: Up in chair, Alarm off, not on at start of session    PT Plan  Treatment/Interventions: Bed mobility, Transfer training, Gait training, Stair training, Neuromuscular re-education, Strengthening, Endurance training, Range of motion, Therapeutic exercise, Therapeutic activity, Home exercise program, Positioning, Postural re-education  PT Plan: Skilled PT  PT Frequency: 4 times per week  PT Discharge Recommendations: Moderate intensity level of continued care  Equipment Recommended upon Discharge: Wheeled walker  PT Recommended Transfer Status: Assist x1, Assistive device (rolling walker)  PT - OK to Discharge: Yes    General Visit Information:   PT  Visit  PT Received On: 12/28/23  Response to Previous Treatment: Other (Comment) (pt reports increased muscle spasms overnight but willing to get OOB today.)  General  Family/Caregiver Present: Yes  Caregiver Feedback: spouse arrived upon completion of therapy.  Co-Treatment: OT  Co-Treatment Reason: pt with need for 2 skilled therapists due to limited tolerance to activity due to right rib pain and muscle spasms  Prior to Session Communication: Bedside nurse  Patient Position Received: Bed, 3 rail up, Alarm off, not on at start of session  Preferred Learning Style: verbal  General Comment: pt agreeable to therapy, very talkative.    Subjective   Precautions:  Precautions  Hearing/Visual Limitations: wears glasses  Medical Precautions: Fall  precautions    Objective   Pain:  Pain Assessment  Pain Assessment: FLACC (Face, Legs, Activity, Cry, Consolability)  Pain Score: 6  Pain Type: Acute pain  Pain Location: Rib cage  Pain Orientation: Right  Pain Descriptors: Shooting, Sharp, Spasm  Clinical Progression: Not changed  Patient's Stated Pain Goal: No pain  Pain Interventions: Repositioned  Response to Interventions: increased time required for pain to decrease slightly  Cognition:  Cognition  Overall Cognitive Status: Within Functional Limits  Postural Control:  Postural Control  Posture Comment: pt attempting to keep an erect, upright posture    Activity Tolerance:  Activity Tolerance  Endurance: Decreased tolerance for upright activites  Activity Tolerance Comments: fair, muiltiple sitting and standing rest breaks required.  Treatments:  Therapeutic Activity  Therapeutic Activity Performed:  (during transfer from supine to sit, HOB laid flat to help pt understand sequencing and process of transfer. max A to elevate trunk, verbal cues for breathing, and cues to stop resisting movement. pt very guarded.)    Balance/Neuromuscular Re-Education  Balance/Neuromuscular Re-Education Activity Performed:  (close supervision for static sitting balance while seated at EOB for several minutes.)    Bed Mobility  Bed Mobility:  (increased time to complete rolling to left side and remain in sidelying. verbal/demonstrative/tactile cues provided for hand placement and technique to guide trunk up. pt continuously bringing feet on and off the bed. poor coordination of body movement.)    Ambulation/Gait Training  Ambulation/Gait Training Performed:  (Amb 30'x2 with rolling walker and min A for balance and safety. pt stopping multiple times for rest and breathing. very narrow SAGRARIO and decreased step length initially. able to widen SAGRARIO and increase step length the last 15'.)    Transfers  Transfer:  (verbal cues for hand placement to stand, min A to guide trunk up and to  maintain balance upon initial stand. Verbal cues for sequencing and safety when sitting in chair. increased time to lower hips to chair, strong use of B UEs on arms of chair.)    Stairs  Stairs: No    Other Activity  Other Activity Performed:  (Instructed in deep breathing multiple times throughout session. pt needs frequent reminders to continue breathing during mobility.)    Outcome Measures:  [facility] Basic Mobility  Turning from your back to your side while in a flat bed without using bedrails: A little  Moving from lying on your back to sitting on the side of a flat bed without using bedrails: Total  Moving to and from bed to chair (including a wheelchair): A lot  Standing up from a chair using your arms (e.g. wheelchair or bedside chair): A lot  To walk in hospital room: A lot  Climbing 3-5 steps with railing: A lot  Basic Mobility - Total Score: 12    IP EDUCATION:  Individual(s) Educated: Patient  Education Provided: Body Mechanics, Fall Risk  Risk and Benefits Discussed with Patient/Caregiver/Other: yes  Patient/Caregiver Demonstrated Understanding: yes  Plan of Care Discussed and Agreed Upon: yes  Patient Response to Education: Patient/Caregiver Verbalized Understanding of Information    Encounter Problems       Encounter Problems (Active)       Mobility       STG - Patient will ambulate 50 feet with modified independent assist (Progressing)       Start:  12/25/23    Expected End:  01/08/24               Transfers       STG - Patient to transfer to and from sit to supine with modified independent  (Progressing)       Start:  12/25/23    Expected End:  01/08/24            STG - Patient will transfer sit to and from stand with modified independent (Progressing)       Start:  12/25/23    Expected End:  01/08/24

## 2023-12-28 NOTE — PROGRESS NOTES
Occupational Therapy    OT Treatment    Patient Name: Skip Selby  MRN: [id number]  Today's Date: 12/28/2023  Time Calculation  Start Time: 0909  Stop Time: 0934  Time Calculation (min): 25 min         Assessment:  OT Assessment: Pt would benefit from acute OT services  Prognosis: Good  End of Session Communication: Bedside nurse  End of Session Patient Position: Up in chair, Alarm off, not on at start of session (all needs in reach, spouse present)  OT Assessment Results: Decreased ADL status, Decreased upper extremity strength, Decreased endurance, Decreased functional mobility  Prognosis: Good  Strengths: Ability to acquire knowledge, Premorbid level of function, Support of extended family/friends  Plan:  Treatment Interventions: ADL retraining, Functional transfer training, Endurance training, Equipment evaluation/education, Patient/family training  OT Frequency: 4 times per week  OT Discharge Recommendations: Moderate intensity level of continued care  OT - OK to Discharge: Yes  Treatment Interventions: ADL retraining, Functional transfer training, Endurance training, Equipment evaluation/education, Patient/family training    Subjective   Previous Visit Info:  OT Last Visit  OT Received On: 12/28/23  General:  General  Reason for Referral: Impaired ADLs; pt admitted from home after sustaining a fall resulting in Right rib fx 4-10  Past Medical History Relevant to Rehab: CAD, HTN, Hyperlipidemia  Missed Visit: No  Missed Visit Reason: Other (Comment)  Family/Caregiver Present: Yes  Caregiver Feedback:  (spouse arrived near end of session)  Co-Treatment: PT  Prior to Session Communication: Bedside nurse  Patient Position Received: Bed, 3 rail up, Alarm off, not on at start of session  General Comment: Cleared by nursing. Pt agreeable to therapy  Precautions:  Medical Precautions: Fall precautions    Pain:  Pain Assessment  Pain Assessment: 0-10  Pain Score:  (2/10 at rest; FLACC 6 with movement)  Pain Type:  Acute pain  Pain Location: Rib cage  Pain Orientation: Right    Objective    Cognition:  Cognition  Overall Cognitive Status: Within Functional Limits    Activities of Daily Living:      Grooming  Grooming Comments: pt declined    UE Bathing  UE Bathing Comments: pt declined    LE Bathing  LE Bathing Comments: pt declined    UE Dressing  UE Dressing Level of Assistance: Minimum assistance  UE Dressing Where Assessed:  (standing at bedside)  UE Dressing Comments: donning/doffing gown       Bed Mobility/Transfers: Bed Mobility  Bed Mobility:  (pt able to roll towards left side and advance BLE off bed, max A for trunk upright supine>seated EOB; increased time to complete with frequent rest breaks secondary to pain)    Transfers  Transfer:  (min A for balance and controlled descent sit<>Stand bed and chair level; VCs for safe hand and leg placement)       Therapy/Activity: Therapeutic Activity  Therapeutic Activity Performed:  (min A for balance for functional mobility extended household distance with use of RW)    Strength:  Strength Comments: NT formally secondary to rib fxs/pain    Outcome Measures:[facility] Daily Activity  Putting on and taking off regular lower body clothing: A lot  Bathing (including washing, rinsing, drying): A lot  Putting on and taking off regular upper body clothing: A little  Toileting, which includes using toilet, bedpan or urinal: A lot  Taking care of personal grooming such as brushing teeth: A little  Eating Meals: None  Daily Activity - Total Score: 16      Education Documentation  ADL Training, taught by Dainta Malin OT at 12/27/2023  3:28 PM.  Learner: Patient  Readiness: Acceptance  Method: Explanation, Demonstration  Response: Verbalizes Understanding, Needs Reinforcement    Education Comments  No comments found.    Goals:  Encounter Problems       Encounter Problems (Active)       ADLs       Pt will complete ADL tasks at mod I with use of AE prn  (Progressing)       Start:   12/27/23    Expected End:  01/17/24               Mobility       Pt will perform functional mobility household distances at mod I with use of LRD.    (Progressing)       Start:  12/27/23    Expected End:  01/17/24               Transfers       Pt will perform functional transfers at mod I. (Progressing)       Start:  12/27/23    Expected End:  01/17/24

## 2023-12-28 NOTE — PROGRESS NOTES
Discussed plan of care in interdisciplinary rounds,  patient is medically ready for discharge.  Precert was submitted on 12/27 by direct submit team, update requested.  Per direct submit team, they have submitted the agreement for SNF, but facility will need to submit for precert.      Updates attached to referral and sent to [facility], requested facility submit for precert.      Met with patient and his wife at bedside to discuss discharge planning.  They are both in agreement to [facility] on discharge, wife requests call with discharge update ([phone]) once arrangements are made.  IMM letter given and explained to patient. Consent/signature obtained and copy given to patient.       UPDATE 1624:  [employer] auth approval received, facility is willing and able to accept patient tomorrow after 10am.  Will update the team.    DC Plan NOT secure  Do NOT DC without speaking to care coordination, PRECERT PENDING FOR SNF   MD will need to sign/certify the [facility] for skilled rehab prior to discharge

## 2023-12-28 NOTE — NURSING NOTE
Pt up assisted to side of bed ambulated with walker to brp then to chair.  Wife is at bedside call light in reach

## 2023-12-28 NOTE — PROGRESS NOTES
"Skip Selby is a 83 y.o. male on day 3 of admission presenting with Multiple rib fractures involving first rib.    Subjective   HPI   Patient was working with PT was having hard time to move from bed to the walker but he started walking in the corridor without any distress. Patient denies any  shortness of breath in room air.  Patient tolerates well her diet with no nausea vomiting or abdominal pain.  No fever or chills.      Objective     Last Recorded Vitals  Blood pressure 148/62, pulse 72, temperature 36.5 °C (97.7 °F), temperature source Oral, resp. rate 16, height 1.702 m (5' 7\"), weight 94.9 kg (209 lb 3.5 oz), SpO2 96 %.      Intake/Output Summary (Last 24 hours) at 12/28/2023 0812  Last data filed at 12/28/2023 0300  Gross per 24 hour   Intake 890 ml   Output 2000 ml   Net -1110 ml           Physical Exam  Constitutional:       Appearance: Normal appearance.   HENT:      Nose: Nose normal.      Mouth/Throat:      Mouth: Mucous membranes are moist.   Eyes:      Extraocular Movements: Extraocular movements intact.      Pupils: Pupils are equal, round, and reactive to light.   Cardiovascular:      Rate and Rhythm: Normal rate and regular rhythm.   Pulmonary:      Effort: Pulmonary effort is normal.      Breath sounds: Normal breath sounds.   Abdominal:      General: Bowel sounds are normal.      Palpations: Abdomen is soft.   Musculoskeletal:         General: Tenderness present.      Cervical back: Normal range of motion and neck supple.      Comments: Ribs pain due to fall    Skin:     General: Skin is warm.   Neurological:      General: No focal deficit present.      Mental Status: He is alert and oriented to person, place, and time.   Psychiatric:         Mood and Affect: Mood normal.        Relevant Results    Lab Results   Component Value Date    WBC 9.2 12/27/2023    HGB 15.3 12/27/2023    HCT 44.2 12/27/2023    MCV 92 12/27/2023     (L) 12/27/2023       Lab Results   Component Value Date    " GLUCOSE 159 (H) 12/27/2023    CALCIUM 8.7 12/27/2023     12/27/2023    K 3.8 12/27/2023    CO2 23 (L) 12/27/2023     12/27/2023    BUN 25 12/27/2023    CREATININE 0.80 12/27/2023       Lab Results   Component Value Date    HGBA1C 5.5 08/27/2021       No results found.  Scheduled medications  acetaminophen, 650 mg, oral, q6h  amLODIPine, 2.5 mg, oral, q24h  apixaban, 5 mg, oral, q12h  aspirin, 81 mg, oral, Daily  baclofen, 10 mg, oral, TID  lidocaine, 1 patch, transdermal, Daily  losartan, 100 mg, oral, q24h  methocarbamol, 500 mg, oral, q8h HEATHER  metoprolol succinate XL, 25 mg, oral, Daily  oxyCODONE ER, 10 mg, oral, q12h HEATHER  pantoprazole, 40 mg, oral, Daily  polyethylene glycol, 17 g, oral, Daily  rosuvastatin, 10 mg, oral, Nightly      Continuous medications     PRN medications  PRN medications: ipratropium-albuteroL, naloxone, ondansetron, oxyCODONE-acetaminophen, oxyCODONE-acetaminophen    Assessment/Plan   Principal Problem:  Multiple rib fractures involving first rib/ ribs pain   CT chest shows multiple right-sided rib fractures.  No pneumothorax no pleural effusion   Keep pain under control   Keep binder on   PT/OT recommend SNF    Nausea/GERD  No nausea today.   Give Zofran as needed  Start him on pantoprazole f    Coronary artery disease, Hyperlipidemia,   No chest pain or shortness of breath  Continue with same home meds statin , aspirin Eliquis and beta-blocker  On Tele     Hypertension   BP is under control  Continue with the same regiment    DVT prophylaxis  On heparin     Discharge plan:  Pt s wife at the bedside answer to all her question and concerns.      Anticipate discharging patient to skilled of nursing     I spent 25 minutes in the professional and overall care of this patient.    Eufemia Marti, APRN-CNP

## 2023-12-29 ENCOUNTER — TELEPHONE (OUTPATIENT)
Dept: PRIMARY CARE | Facility: CLINIC | Age: 83
End: 2023-12-29
Payer: MEDICARE

## 2023-12-29 VITALS
TEMPERATURE: 97.9 F | SYSTOLIC BLOOD PRESSURE: 156 MMHG | BODY MASS INDEX: 32.84 KG/M2 | WEIGHT: 209.22 LBS | DIASTOLIC BLOOD PRESSURE: 85 MMHG | OXYGEN SATURATION: 96 % | RESPIRATION RATE: 18 BRPM | HEIGHT: 67 IN | HEART RATE: 83 BPM

## 2023-12-29 PROCEDURE — 2500000001 HC RX 250 WO HCPCS SELF ADMINISTERED DRUGS (ALT 637 FOR MEDICARE OP)

## 2023-12-29 PROCEDURE — 2500000001 HC RX 250 WO HCPCS SELF ADMINISTERED DRUGS (ALT 637 FOR MEDICARE OP): Performed by: NURSE PRACTITIONER

## 2023-12-29 PROCEDURE — 2500000004 HC RX 250 GENERAL PHARMACY W/ HCPCS (ALT 636 FOR OP/ED): Performed by: INTERNAL MEDICINE

## 2023-12-29 PROCEDURE — 2500000001 HC RX 250 WO HCPCS SELF ADMINISTERED DRUGS (ALT 637 FOR MEDICARE OP): Performed by: INTERNAL MEDICINE

## 2023-12-29 PROCEDURE — 2500000005 HC RX 250 GENERAL PHARMACY W/O HCPCS

## 2023-12-29 PROCEDURE — 2500000004 HC RX 250 GENERAL PHARMACY W/ HCPCS (ALT 636 FOR OP/ED): Performed by: NURSE PRACTITIONER

## 2023-12-29 RX ORDER — OXYCODONE AND ACETAMINOPHEN 5; 325 MG/1; MG/1
2 TABLET ORAL EVERY 4 HOURS PRN
Qty: 5 TABLET | Refills: 0 | Status: SHIPPED | OUTPATIENT
Start: 2023-12-29 | End: 2024-01-01

## 2023-12-29 RX ORDER — OXYCODONE AND ACETAMINOPHEN 5; 325 MG/1; MG/1
1 TABLET ORAL EVERY 6 HOURS PRN
Qty: 18 TABLET | Refills: 0 | Status: SHIPPED | OUTPATIENT
Start: 2023-12-29 | End: 2024-01-01

## 2023-12-29 RX ORDER — OXYCODONE HCL 10 MG/1
10 TABLET, FILM COATED, EXTENDED RELEASE ORAL EVERY 12 HOURS SCHEDULED
Qty: 6 TABLET | Refills: 0 | Status: SHIPPED | OUTPATIENT
Start: 2023-12-29 | End: 2024-01-01

## 2023-12-29 RX ADMIN — ASPIRIN 81 MG: 81 TABLET, COATED ORAL at 08:13

## 2023-12-29 RX ADMIN — APIXABAN 5 MG: 5 TABLET, FILM COATED ORAL at 08:13

## 2023-12-29 RX ADMIN — LIDOCAINE 1 PATCH: 4 PATCH TOPICAL at 08:13

## 2023-12-29 RX ADMIN — METHOCARBAMOL TABLETS 500 MG: 500 TABLET, COATED ORAL at 05:07

## 2023-12-29 RX ADMIN — METOPROLOL SUCCINATE 25 MG: 25 TABLET, EXTENDED RELEASE ORAL at 08:13

## 2023-12-29 RX ADMIN — OXYCODONE HYDROCHLORIDE 10 MG: 10 TABLET, FILM COATED, EXTENDED RELEASE ORAL at 08:13

## 2023-12-29 RX ADMIN — ACETAMINOPHEN 650 MG: 325 TABLET ORAL at 05:07

## 2023-12-29 RX ADMIN — LOSARTAN POTASSIUM 100 MG: 100 TABLET, FILM COATED ORAL at 05:07

## 2023-12-29 RX ADMIN — AMLODIPINE BESYLATE 2.5 MG: 2.5 TABLET ORAL at 05:07

## 2023-12-29 RX ADMIN — PANTOPRAZOLE SODIUM 40 MG: 40 TABLET, DELAYED RELEASE ORAL at 08:13

## 2023-12-29 RX ADMIN — POLYETHYLENE GLYCOL 3350 17 G: 17 POWDER, FOR SOLUTION ORAL at 08:13

## 2023-12-29 RX ADMIN — BACLOFEN 10 MG: 10 TABLET ORAL at 08:13

## 2023-12-29 ASSESSMENT — PAIN - FUNCTIONAL ASSESSMENT: PAIN_FUNCTIONAL_ASSESSMENT: 0-10

## 2023-12-29 ASSESSMENT — PAIN SCALES - GENERAL: PAINLEVEL_OUTOF10: 0 - NO PAIN

## 2023-12-29 NOTE — CARE PLAN
Pt has an active discharge for today.  7000 completed in [facility]; sent discharge order and goldenrod to [facility]; spoke with floor nurse; transportation set up for 12 noon today, [place] is aware of trasport time.  Phoned pts wife and notifed her of the discharge time and informed her that [employer] insurance may, or may not cover the transportation to [place]; they may get a bill      DISCHARGE PLAN--DISCHARGE TODAY TO [facility]

## 2023-12-29 NOTE — CARE PLAN
The patient's goals for the shift include pain managment    The clinical goals for the shift include pain management overnight.

## 2023-12-29 NOTE — DISCHARGE SUMMARY
Discharge Diagnosis  Multiple rib fractures involving first rib        Issues Requiring Follow-Up  Follow up with your PCP Teto Box DO, within 1 week     Imaging results   CT chest wo IV contrast    Result Date: 12/25/2023  Interpreted By:  Parveen Spain, STUDY: CT CHEST WO IV CONTRAST; 12/25/2023 1:39 am   INDICATION: Signs/Symptoms:fall, right posterior and lateral rib pain.   COMPARISON: None   ACCESSION NUMBER(S): [id number]   ORDERING CLINICIAN: MARLON JUNG   TECHNIQUE: Contiguous axial images of the thorax were obtained from the level of the thoracic inlet through the lung bases. 100 ml of Omnipaque 350 was utilized.All CT examinations are performed with 1 or more of the following dose reduction techniques: Automated exposure control, adjustment of mA and/or kv according to patient's size, or use of iterative reconstruction techniques.     FINDINGS: The thyroid gland is only partially visualized and otherwise unremarkable.   The heart size is top-normal.. Prominent coronary artery calcifications. No pericardial effusion is identified. The aorta and pulmonary arteries are normal in caliber.   There are no pathologically enlarged mediastinal, hilar, or axillary lymph nodes are seen.   The trachea and mainstem bronchi are patent. 8 mm calcified granuloma in the left upper lobe. No significant consolidation. Mild dependent changes. There is no evidence of pneumothorax. Trace right pleural effusion. No left pleural effusion.   The visualized osseous structures show acute, single non segmented right rib fractures at ribs 4, 5, 9, and 10. There also acute, multiple segmental right rib fractures at ribs 6 and 7. Old healed rib fractures of right ribs 3 and 4.   Limited images through the upper abdomen are unremarkable.       Acute right rib fractures at ribs 4, 5, 6, 7, 9, and 10.   Old healed right rib fractures of ribs 3 and 4.   Trace right pleural effusion.   Signed by: Parveen Spain  "12/25/2023 9:12 AM Dictation workstation:   [id number]      Hospital Course  This is 83 years old male who presented to [facility] on 12/24/2023 due to right-sided chest pain after accidental fall.  He has been found to have multiple right-sided rib fracture.  PT OT recommends physical rehab.  Patient will be discharged with pain meds as needed.  Patient is hemodynamically stable.    Physical exam  Physical Exam  General: alert, no diaphoresis   HENT: mucous membranes moist, external ears normal, no rhinorrhea   Eyes: no icterus or injection, no discharge   Lungs: CTA BL   Heart: RRR, no murmurs, no LE edema BL   GI: abdomen soft, nontender, nondistended, BS present   MSK: no joint effusion or deformity   Skin: no rashes, erythema, or ecchymosis   Neuro: grossly normal cognition, motor strength, sensation     Relevant Results    Lab Results   Component Value Date    WBC 9.2 12/27/2023    HGB 15.3 12/27/2023    HCT 44.2 12/27/2023    MCV 92 12/27/2023     (L) 12/27/2023     Lab Results   Component Value Date    GLUCOSE 159 (H) 12/27/2023    CALCIUM 8.7 12/27/2023     12/27/2023    K 3.8 12/27/2023    CO2 23 (L) 12/27/2023     12/27/2023    BUN 25 12/27/2023    CREATININE 0.80 12/27/2023     No results found for: \"INR\", \"PROTIME\"         Medication List      START taking these medications     baclofen 10 mg tablet; Commonly known as: Lioresal; Take 1 tablet (10   mg) by mouth 3 times a day.   lidocaine 4 % patch; Place 1 patch over 12 hours on the skin once daily.   Remove & discard patch within 12 hours or as directed by MD. Do not start   before December 28, 2023.   methocarbamol 500 mg tablet; Commonly known as: Robaxin; Take 1 tablet   (500 mg) by mouth every 8 hours.   oxyCODONE ER 10 mg 12 hr tablet; Commonly known as: OxyCONTIN; Take 1   tablet (10 mg) by mouth every 12 hours for 3 days. Do not crush, chew, or   split.   * oxyCODONE-acetaminophen 5-325 mg tablet; Commonly known as: Percocet; "   Take 1 tablet by mouth every 6 hours if needed for severe pain (7 - 10)   for up to 3 days.   * oxyCODONE-acetaminophen 5-325 mg tablet; Commonly known as: Percocet;   Take 2 tablets by mouth every 4 hours if needed for severe pain (7 - 10)   or moderate pain (4 - 6) for up to 3 days.   pantoprazole 40 mg EC tablet; Commonly known as: ProtoNix; Take 1 tablet   (40 mg) by mouth once daily. Do not crush, chew, or split. Do not start   before December 29, 2023.   polyethylene glycol 17 gram packet; Commonly known as: Glycolax,   Miralax; Take 17 g by mouth once daily. Do not start before December 29, 2023.  * This list has 2 medication(s) that are the same as other medications   prescribed for you. Read the directions carefully, and ask your doctor or   other care provider to review them with you.     CONTINUE taking these medications     amLODIPine 2.5 mg tablet; Commonly known as: Norvasc   aspirin 81 mg EC tablet   Crestor 10 mg tablet; Generic drug: rosuvastatin   Eliquis 5 mg tablet; Generic drug: apixaban   losartan 100 mg tablet; Commonly known as: Cozaar   metoprolol succinate XL 25 mg 24 hr tablet; Commonly known as: Toprol-XL     STOP taking these medications     glucosamine sulfate 500 mg capsule   oxybutynin 5 mg tablet; Commonly known as: Ditropan   sildenafil 20 mg tablet; Commonly known as: Revatio       Outpatient Follow-Up  Future Appointments   Date Time Provider Department Center   3/13/2024  9:15 AM DO Tara Vail [facility]   4/8/2024  1:45 PM GIOVANNI Graham [id number] [facility]   9/16/2024  9:15 AM DO Tara Vail [facility]         Time overall spent on discharge summary 35 minutes    GIOVANNI Huber

## 2023-12-29 NOTE — PROGRESS NOTES
"Skip Selby is a 83 y.o. male on day 4 of admission presenting with Multiple rib fractures involving first rib.    Subjective   HPI   Patient was working with PT was having hard time to move from bed to the walker but he started walking in the corridor without any distress. Patient denies any  shortness of breath in room air.  Patient tolerates well her diet with no nausea vomiting or abdominal pain.  No fever or chills.      Objective     Last Recorded Vitals  Blood pressure 156/85, pulse 83, temperature 36.6 °C (97.9 °F), temperature source Oral, resp. rate 18, height 1.702 m (5' 7\"), weight 94.9 kg (209 lb 3.5 oz), SpO2 96 %.      Intake/Output Summary (Last 24 hours) at 12/29/2023 0837  Last data filed at 12/29/2023 0803  Gross per 24 hour   Intake 1350 ml   Output 700 ml   Net 650 ml           Physical Exam  Constitutional:       Appearance: Normal appearance.   HENT:      Nose: Nose normal.      Mouth/Throat:      Mouth: Mucous membranes are moist.   Eyes:      Extraocular Movements: Extraocular movements intact.      Pupils: Pupils are equal, round, and reactive to light.   Cardiovascular:      Rate and Rhythm: Normal rate and regular rhythm.   Pulmonary:      Effort: Pulmonary effort is normal.      Breath sounds: Normal breath sounds.   Abdominal:      General: Bowel sounds are normal.      Palpations: Abdomen is soft.   Musculoskeletal:         General: Tenderness present.      Cervical back: Normal range of motion and neck supple.      Comments: Ribs pain due to fall    Skin:     General: Skin is warm.   Neurological:      General: No focal deficit present.      Mental Status: He is alert and oriented to person, place, and time.   Psychiatric:         Mood and Affect: Mood normal.        Relevant Results    Lab Results   Component Value Date    WBC 9.2 12/27/2023    HGB 15.3 12/27/2023    HCT 44.2 12/27/2023    MCV 92 12/27/2023     (L) 12/27/2023       Lab Results   Component Value Date    " GLUCOSE 159 (H) 12/27/2023    CALCIUM 8.7 12/27/2023     12/27/2023    K 3.8 12/27/2023    CO2 23 (L) 12/27/2023     12/27/2023    BUN 25 12/27/2023    CREATININE 0.80 12/27/2023       Lab Results   Component Value Date    HGBA1C 5.5 08/27/2021       No results found.  Scheduled medications  acetaminophen, 650 mg, oral, q6h  amLODIPine, 2.5 mg, oral, q24h  apixaban, 5 mg, oral, q12h  aspirin, 81 mg, oral, Daily  baclofen, 10 mg, oral, TID  lidocaine, 1 patch, transdermal, Daily  losartan, 100 mg, oral, q24h  methocarbamol, 500 mg, oral, q8h HEATHER  metoprolol succinate XL, 25 mg, oral, Daily  oxyCODONE ER, 10 mg, oral, q12h HEATHER  pantoprazole, 40 mg, oral, Daily  polyethylene glycol, 17 g, oral, Daily  rosuvastatin, 10 mg, oral, Nightly      Continuous medications     PRN medications  PRN medications: ipratropium-albuteroL, naloxone, ondansetron, oxyCODONE-acetaminophen, oxyCODONE-acetaminophen    Assessment/Plan   Principal Problem:  Multiple rib fractures involving first rib/ ribs pain   CT chest shows multiple right-sided rib fractures.  No pneumothorax no pleural effusion   Keep pain under control   Keep binder on   PT/OT recommend SNF    Nausea/GERD  No nausea today.   Give Zofran as needed  Start him on pantoprazole f    Coronary artery disease, Hyperlipidemia,   No chest pain or shortness of breath  Continue with same home meds statin , aspirin Eliquis and beta-blocker  On Tele     Hypertension   BP is under control  Continue with the same regiment    DVT prophylaxis  On heparin     Discharge plan:  Pt s wife at the bedside answer to all her question and concerns.      Anticipate discharging patient to skilled of nursing     I spent 25 minutes in the professional and overall care of this patient.    Eufemia Marti, APRN-CNP

## 2023-12-29 NOTE — NURSING NOTE
Assumed care of pt, BSSR complete. Pt is resting in bed with call light in reach. He voices no concerns or complaints at this time and is AxOx3. He is eager to be discharged

## 2023-12-29 NOTE — TELEPHONE ENCOUNTER
Pt's wife stopped by to notify you, about jethro's [facility] adm 12/25/23 for 6 broken ribs. He's being transferred to [facility] today.

## 2024-01-03 ENCOUNTER — TELEPHONE (OUTPATIENT)
Dept: PRIMARY CARE | Facility: CLINIC | Age: 84
End: 2024-01-03
Payer: MEDICARE

## 2024-01-03 NOTE — TELEPHONE ENCOUNTER
Spoke with Jose at [facility] and verified that you are the patient's PCP and would follow patient during home care as well as sign off on orders.

## 2024-01-16 PROBLEM — M72.0 DUPUYTREN'S DISEASE OF PALM OF BOTH HANDS: Status: ACTIVE | Noted: 2024-01-16

## 2024-01-16 PROBLEM — M51.36 DDD (DEGENERATIVE DISC DISEASE), LUMBAR: Status: ACTIVE | Noted: 2024-01-16

## 2024-01-16 PROBLEM — I25.10 CORONARY ARTERY DISEASE INVOLVING NATIVE CORONARY ARTERY OF NATIVE HEART WITHOUT ANGINA PECTORIS: Status: ACTIVE | Noted: 2024-01-16

## 2024-01-16 PROBLEM — E78.00 PURE HYPERCHOLESTEROLEMIA: Status: ACTIVE | Noted: 2024-01-16

## 2024-01-16 PROBLEM — M47.812 CERVICAL SPONDYLOSIS: Status: ACTIVE | Noted: 2024-01-16

## 2024-01-16 PROBLEM — M51.369 DDD (DEGENERATIVE DISC DISEASE), LUMBAR: Status: ACTIVE | Noted: 2024-01-16

## 2024-01-16 PROBLEM — M16.0 OSTEOARTHRITIS OF HIPS, BILATERAL: Status: ACTIVE | Noted: 2024-01-16

## 2024-01-16 PROBLEM — I48.0 PAROXYSMAL ATRIAL FIBRILLATION (MULTI): Status: ACTIVE | Noted: 2024-01-16

## 2024-01-16 PROBLEM — I10 BENIGN ESSENTIAL HYPERTENSION: Status: ACTIVE | Noted: 2024-01-16

## 2024-01-17 ENCOUNTER — OFFICE VISIT (OUTPATIENT)
Dept: PRIMARY CARE | Facility: CLINIC | Age: 84
End: 2024-01-17
Payer: MEDICARE

## 2024-01-17 VITALS
HEIGHT: 67 IN | BODY MASS INDEX: 32.96 KG/M2 | OXYGEN SATURATION: 97 % | DIASTOLIC BLOOD PRESSURE: 70 MMHG | HEART RATE: 80 BPM | TEMPERATURE: 98.1 F | SYSTOLIC BLOOD PRESSURE: 108 MMHG | WEIGHT: 210 LBS

## 2024-01-17 DIAGNOSIS — R60.0 BILATERAL LOWER EXTREMITY EDEMA: ICD-10-CM

## 2024-01-17 DIAGNOSIS — S22.41XD CLOSED FRACTURE OF MULTIPLE RIBS OF RIGHT SIDE WITH ROUTINE HEALING, SUBSEQUENT ENCOUNTER: Primary | ICD-10-CM

## 2024-01-17 PROCEDURE — 1111F DSCHRG MED/CURRENT MED MERGE: CPT | Performed by: FAMILY MEDICINE

## 2024-01-17 PROCEDURE — 1125F AMNT PAIN NOTED PAIN PRSNT: CPT | Performed by: FAMILY MEDICINE

## 2024-01-17 PROCEDURE — 99214 OFFICE O/P EST MOD 30 MIN: CPT | Performed by: FAMILY MEDICINE

## 2024-01-17 PROCEDURE — 3074F SYST BP LT 130 MM HG: CPT | Performed by: FAMILY MEDICINE

## 2024-01-17 PROCEDURE — 3078F DIAST BP <80 MM HG: CPT | Performed by: FAMILY MEDICINE

## 2024-01-17 PROCEDURE — 1036F TOBACCO NON-USER: CPT | Performed by: FAMILY MEDICINE

## 2024-01-17 PROCEDURE — 1159F MED LIST DOCD IN RCRD: CPT | Performed by: FAMILY MEDICINE

## 2024-01-17 RX ORDER — FUROSEMIDE 20 MG/1
20 TABLET ORAL DAILY
Qty: 30 TABLET | Refills: 0 | Status: SHIPPED | OUTPATIENT
Start: 2024-01-17 | End: 2025-01-16

## 2024-01-17 ASSESSMENT — PAIN SCALES - GENERAL: PAINLEVEL: 5

## 2024-01-17 NOTE — PROGRESS NOTES
"Subjective   Patient ID: Skip Selby is a 83 y.o. male who presents for Hospital Follow-up ([facility]- hiking and fell and broke 6 ribs).    Pt is here for hospital follow up.     Son had pancreatitic cancer - had surgery at [facility].  Patient [place]  [place].  Patient was able to get up with help.  Patient developed significant pain.  EMS was called.     Patient went to ER - Pt had multiple rib fractures.  Patient was on IV pain medications.  Pt was placed on muscle relaxants also.  Pt was at [facility] for SNF for 1 week.  Pt sent home with home health care.  Incentive spirometer.     Currently pain is manageable.  Not needing pain medications.      Patient has swelling in the feet.  Sleeping in recliner.  Started swelling couple days.           Review of Systems    Objective   /70 (BP Location: Right arm, Patient Position: Sitting, BP Cuff Size: Large adult)   Pulse 80   Temp 36.7 °C (98.1 °F)   Ht 1.702 m (5' 7\")   Wt 95.3 kg (210 lb) Comment: fully clothed  SpO2 97%   BMI 32.89 kg/m²     Physical Exam  Vitals reviewed.   Constitutional:       General: He is not in acute distress.  Cardiovascular:      Rate and Rhythm: Normal rate and regular rhythm.   Pulmonary:      Effort: Pulmonary effort is normal.      Breath sounds: No wheezing or rhonchi.   Chest:      Chest wall: Tenderness present. No deformity or crepitus.       Musculoskeletal:      Right lower leg: Edema present.      Left lower leg: Edema present.   Lymphadenopathy:      Cervical: No cervical adenopathy.   Neurological:      Mental Status: He is alert.         Assessment/Plan   Diagnoses and all orders for this visit:  Closed fracture of multiple ribs of right side with routine healing, subsequent encounter  Bilateral lower extremity edema    Patient Instructions   Patient is here for hospital follow up.  Pt had multiple rib fractures.  Pain stable.  Tylenol as " needed.  Decrease methocarbamol.  Use if needed.      For edema - likely due to recliner.  Recommend elevating feet (try sleeping in bed), compression stockings/tight knee high socks, increase activity as tolerated and diuretic therapy for 3-4 days.  IF not improving call.      Follow up as scheduled.

## 2024-01-17 NOTE — PATIENT INSTRUCTIONS
Patient is here for hospital follow up.  Pt had multiple rib fractures.  Pain stable.  Tylenol as needed.  Decrease methocarbamol.  Use if needed.      For edema - likely due to recliner.  Recommend elevating feet (try sleeping in bed), compression stockings/tight knee high socks, increase activity as tolerated and diuretic therapy for 3-4 days.  IF not improving call.      Follow up as scheduled.

## 2024-01-18 DIAGNOSIS — S22.41XA CLOSED FRACTURE OF MULTIPLE RIBS OF RIGHT SIDE, INITIAL ENCOUNTER: ICD-10-CM

## 2024-01-18 DIAGNOSIS — S22.49XA MULTIPLE RIB FRACTURES INVOLVING FIRST RIB: ICD-10-CM

## 2024-01-18 RX ORDER — BACLOFEN 10 MG/1
10 TABLET ORAL 3 TIMES DAILY
Qty: 270 TABLET | Refills: 0 | Status: SHIPPED | OUTPATIENT
Start: 2024-01-18 | End: 2024-03-13 | Stop reason: WASHOUT

## 2024-01-18 RX ORDER — METHOCARBAMOL 500 MG/1
500 TABLET, FILM COATED ORAL EVERY 8 HOURS SCHEDULED
Qty: 270 TABLET | Refills: 0 | Status: SHIPPED | OUTPATIENT
Start: 2024-01-18 | End: 2024-03-13 | Stop reason: WASHOUT

## 2024-01-18 NOTE — TELEPHONE ENCOUNTER
Pt's wife Belen dropped off a note for you, for a refill    baclofen (Lioresal) 10 mg tablet Take 1 tablet (10 mg) by mouth 3 times a day.     methocarbamol (Robaxin) 500 mg tablet Take 1 tablet (500 mg) by mouth every 8 hours.     Pharm:   [facility]

## 2024-01-19 ENCOUNTER — PATIENT OUTREACH (OUTPATIENT)
Dept: PRIMARY CARE | Facility: CLINIC | Age: 84
End: 2024-01-19
Payer: MEDICARE

## 2024-03-13 ENCOUNTER — OFFICE VISIT (OUTPATIENT)
Dept: PRIMARY CARE | Facility: CLINIC | Age: 84
End: 2024-03-13
Payer: MEDICARE

## 2024-03-13 VITALS
SYSTOLIC BLOOD PRESSURE: 130 MMHG | WEIGHT: 204 LBS | BODY MASS INDEX: 32.02 KG/M2 | HEIGHT: 67 IN | OXYGEN SATURATION: 97 % | DIASTOLIC BLOOD PRESSURE: 72 MMHG | HEART RATE: 75 BPM

## 2024-03-13 DIAGNOSIS — Z00.00 ROUTINE GENERAL MEDICAL EXAMINATION AT A HEALTH CARE FACILITY: Primary | ICD-10-CM

## 2024-03-13 DIAGNOSIS — I10 BENIGN ESSENTIAL HYPERTENSION: Primary | ICD-10-CM

## 2024-03-13 DIAGNOSIS — E78.00 PURE HYPERCHOLESTEROLEMIA: ICD-10-CM

## 2024-03-13 DIAGNOSIS — N40.1 BENIGN PROSTATIC HYPERPLASIA WITH LOWER URINARY TRACT SYMPTOMS, SYMPTOM DETAILS UNSPECIFIED: ICD-10-CM

## 2024-03-13 DIAGNOSIS — I10 BENIGN ESSENTIAL HYPERTENSION: ICD-10-CM

## 2024-03-13 DIAGNOSIS — I25.10 CORONARY ARTERY DISEASE INVOLVING NATIVE CORONARY ARTERY OF NATIVE HEART WITHOUT ANGINA PECTORIS: ICD-10-CM

## 2024-03-13 DIAGNOSIS — I48.0 PAROXYSMAL ATRIAL FIBRILLATION (MULTI): ICD-10-CM

## 2024-03-13 PROBLEM — S22.49XA MULTIPLE RIB FRACTURES INVOLVING FIRST RIB: Status: RESOLVED | Noted: 2023-12-25 | Resolved: 2024-03-13

## 2024-03-13 PROCEDURE — 3078F DIAST BP <80 MM HG: CPT | Performed by: FAMILY MEDICINE

## 2024-03-13 PROCEDURE — 1126F AMNT PAIN NOTED NONE PRSNT: CPT | Performed by: FAMILY MEDICINE

## 2024-03-13 PROCEDURE — 1159F MED LIST DOCD IN RCRD: CPT | Performed by: FAMILY MEDICINE

## 2024-03-13 PROCEDURE — 99213 OFFICE O/P EST LOW 20 MIN: CPT | Performed by: FAMILY MEDICINE

## 2024-03-13 PROCEDURE — 1160F RVW MEDS BY RX/DR IN RCRD: CPT | Performed by: FAMILY MEDICINE

## 2024-03-13 PROCEDURE — 3075F SYST BP GE 130 - 139MM HG: CPT | Performed by: FAMILY MEDICINE

## 2024-03-13 PROCEDURE — 1036F TOBACCO NON-USER: CPT | Performed by: FAMILY MEDICINE

## 2024-03-13 ASSESSMENT — ENCOUNTER SYMPTOMS
HYPERTENSION: 1
DEPRESSION: 0
OCCASIONAL FEELINGS OF UNSTEADINESS: 0
LOSS OF SENSATION IN FEET: 0

## 2024-03-13 ASSESSMENT — PATIENT HEALTH QUESTIONNAIRE - PHQ9
1. LITTLE INTEREST OR PLEASURE IN DOING THINGS: NOT AT ALL
SUM OF ALL RESPONSES TO PHQ9 QUESTIONS 1 AND 2: 0
2. FEELING DOWN, DEPRESSED OR HOPELESS: NOT AT ALL

## 2024-03-13 ASSESSMENT — PAIN SCALES - GENERAL: PAINLEVEL: 0-NO PAIN

## 2024-03-13 NOTE — PROGRESS NOTES
"Subjective   Patient ID: Skip Selby is a 83 y.o. male who presents for Hypertension.    Here for follow up.  Pt recently had rib fractures - pain much improved.  Done with therapy.  Going to [facility].  Back to normal activities.      CAD  -Doing well.  Still on crestor nad   -Specialist: Dr. Norton    Hypertension  -Patient is here for follow-up of elevated blood pressure.   -Blood pressure is well controlled.  -Cardiac symptoms: none.   -Patient denies chest pain, dyspnea, and irregular heart beat.   -Cardiologist: Dr. Norton    Atrial Fibrillation  -F/U: doing well.  No new concerns.  Tolerating medications well.   -Anticoagulation: Eliquis - tolerating well.    -Cardiologist:  Dr. Norton        Hypertension         Review of Systems    Objective   /72 (BP Location: Right arm, Patient Position: Sitting, BP Cuff Size: Large adult)   Pulse 75   Ht 1.702 m (5' 7\")   Wt 92.5 kg (204 lb)   SpO2 97%   BMI 31.95 kg/m²     Physical Exam  Vitals reviewed.   Constitutional:       General: He is not in acute distress.  Cardiovascular:      Rate and Rhythm: Normal rate and regular rhythm.   Pulmonary:      Effort: Pulmonary effort is normal.      Breath sounds: No wheezing or rhonchi.   Musculoskeletal:      Right lower leg: No edema.      Left lower leg: No edema.   Lymphadenopathy:      Cervical: No cervical adenopathy.   Neurological:      Mental Status: He is alert.         Assessment/Plan   Diagnoses and all orders for this visit:  Benign essential hypertension  Coronary artery disease involving native coronary artery of native heart without angina pectoris  Pure hypercholesterolemia  Paroxysmal atrial fibrillation (CMS/HCC)    Patient Instructions   Here for follow up.      Blood pressure is well controlled.     Afib - stable.  Tolerating eliquis - continue twice a day    For coronary artery disease - continue crestor.      Follow up in 6 months - blood work 1 week prior.        "

## 2024-03-13 NOTE — PATIENT INSTRUCTIONS
Here for follow up.      Blood pressure is well controlled.     Afib - stable.  Tolerating eliquis - continue twice a day    For coronary artery disease - continue crestor.      Follow up in 6 months - blood work 1 week prior.

## 2024-04-08 ENCOUNTER — APPOINTMENT (OUTPATIENT)
Dept: DERMATOLOGY | Facility: CLINIC | Age: 84
End: 2024-04-08
Payer: MEDICARE

## 2024-05-08 ENCOUNTER — CLINICAL SUPPORT (OUTPATIENT)
Dept: AUDIOLOGY | Facility: CLINIC | Age: 84
End: 2024-05-08
Payer: COMMERCIAL

## 2024-05-08 DIAGNOSIS — H90.3 ASYMMETRICAL SENSORINEURAL HEARING LOSS: Primary | ICD-10-CM

## 2024-05-08 PROCEDURE — HRANC PR HEARING AID NO CHARGE: Performed by: AUDIOLOGIST

## 2024-05-09 ENCOUNTER — CLINICAL SUPPORT (OUTPATIENT)
Dept: AUDIOLOGY | Facility: CLINIC | Age: 84
End: 2024-05-09
Payer: MEDICARE

## 2024-05-09 DIAGNOSIS — H90.3 ASYMMETRICAL SENSORINEURAL HEARING LOSS: Primary | ICD-10-CM

## 2024-05-09 PROCEDURE — HRANC PR HEARING AID NO CHARGE: Performed by: AUDIOLOGIST

## 2024-05-09 NOTE — PROGRESS NOTES
The patient was seen yesterday for a [facility] consult.  He would like to order [facility] advanced rechargeable in-the-canal hearing aids.  Took impressions of both ears without incident.  Put the order in the [facility] portal and scheduled the fitting and follow up appointments.  N/C    Appointment time:  9:00-9:30

## 2024-05-09 NOTE — PROGRESS NOTES
HEARING AID CHECK/[facility] CONSULT    RIGHT: [facility] PREMIUM LI 19 RECHARGEABLE REKHA WITH #1S  AND 8 MM OPEN CLICK DOME  S.N.: [id number] (FORMERLY [id number])  LEFT: [facility] PREMIUM LI 19 RECHARGEABLE REKHA WITH #2M  AND 8 MM CLOSED CLICK DOME  S.N.: [id number]  WARRANTY EXPIRATION: 3/10/2022 LEFT, 12/1/2023 RIGHT    The patient originally came in today for a hearing aid check.  His right hearing aid is functioning, but his left aid is not functioning, even with a new .  Discussed new hearing aid technology, and the patient would like to order new hearing aids using his benefit through [facility].  Added the patient to the [facility] portal and discussed the various amplification options with the patient, including the various styles of hearing aids and the differences in technology levels.  He previously wore rechargeable Rekha style hearing aids, but he would instead like to obtain rechargeable in-the-canal hearing aids.  He would like to order the advanced level of technology.  Scheduled him to come in tomorrow so I can take impressions of both ears since time does not allow that today.      APPOINTMENT TIME:  1:30-2:00

## 2024-05-09 NOTE — PROGRESS NOTES
AUDIOLOGY ADULT AUDIOMETRIC EVALUATION    Name:  Skip Selby  :  1940  Age:  83 y.o.  Date of Evaluation:  May 8, 2024    Reason for visit: Mr. Selby is seen in the clinic today for an audiologic evaluation.     HISTORY  The patient has a history of asymmetrical sensorineural hearing loss and wears binaural hearing aids.  He denied otalgia, aural pressure, tinnitus and dizziness     EVALUATION  See scanned audiogram: “Media” > “Audiology Report”.    RESULTS  Otoscopic Evaluation:  Right Ear: clear ear canal  Left Ear: clear ear canal    Immittance Measures:  Tympanometry:  Right Ear: Type A, normal tympanic membrane mobility with normal middle ear pressure   Left Ear: Type A, normal tympanic membrane mobility with normal middle ear pressure     Acoustic Reflexes:  Ipsilateral Right Ear: did not evaluate   Ipsilateral Left Ear: did not evaluate   Contralateral Right Ear: did not evaluate  Contralateral Left Ear: did not evaluate    Distortion Product Otoacoustic Emissions (DPOAEs):  Right Ear: did not evaluate   Left Ear: did not evaluate     Audiometry:  Test Technique and Reliability:   Standard audiometry via supra-aural headphones. Reliability is good.    Pure tone air and bone conduction audiometry:  Right Ear: mild sloping to moderately-severe sensorineural hearing loss   Left Ear: mild sloping to severe sensorineural hearing loss     Speech Audiometry (Word Recognition Scores):   Right Ear: Good, 88%   Left Ear: Fair, 76%    IMPRESSIONS  Results of today's audiometric evaluation revealed an asymmetrical sensorineural hearing loss.    RECOMMENDATIONS  - Follow up with otolaryngology today as needed.  - Annual audiologic evaluation, sooner if an acute change is noted.  - Continued hearing aid use.  Consider obtaining new hearing aids.  - Follow-up with audiology annually for routine hearing aid maintenance, sooner if questions/problems arise.  - Follow-up with medical care team as  planned.    PATIENT EDUCATION  Discussed results, impressions and recommendations with the patient. Questions were addressed and the patient was encouraged to contact our office should concerns arise.    Time for this encounter: 1:00-1:30    Meron Basilio M.A., CCC-A   Licensed Audiologist

## 2024-06-13 ENCOUNTER — APPOINTMENT (OUTPATIENT)
Dept: AUDIOLOGY | Facility: CLINIC | Age: 84
End: 2024-06-13
Payer: COMMERCIAL

## 2024-06-13 DIAGNOSIS — H90.3 SENSORINEURAL HEARING LOSS (SNHL) OF BOTH EARS: Primary | ICD-10-CM

## 2024-06-13 NOTE — PROGRESS NOTES
HEARING AID FITTING- [facility]    RIGHT: ZACK HEARING (SIGNIA) 6 Li ADVANCED RECHARGEABLE IN-THE-CANAL AID   S.N.: [id number]  LEFT:  [facility] HEARING (SIGNIA) 6 Li ADVANCED RECHARGEABLE IN-THE-CANAL AID   S.N.: [id number]  WARRANTY EXPIRES: 6/13/2027    Fit the patient with the above listed hearing aids set to first fit for an experienced user.  Performed speechmapping and adjusted the gain to approximate his targets for soft, moderate and loud speech inputs.  The patient reported that he could hear well after today's adjustments.  Discussed use and care of the hearing aids with the patient.  He is a previous hearing aid user and is familiar with how to replace the wax guards.  His push buttons are set up for right volume increase and left volume decrease.  The patient has a [employer] cell phone that is not compatible with the hearing aids for streaming or for the [facility] antony.  In addition to today's verbal instruction of the hearing devices, the patient was given written instructions from the hearing aid . Hearing aid limitations were discussed at length as well as realistic expectations. The patient was advised in order to receive full benefit of amplification, consistent use during all waking hours is recommended.  The repair warranty and the conditions of the right-to-return period were discussed. The patient reports understanding of these conditions. The [facility] Delivery receipt was signed and his hearing aids were received in the [facility] portal (see scanned documents).  The patient has a follow up appointment scheduled for two weeks.  If he is doing well he will call and cancel it, and I will send him a recall postcard in one year.       Appointment time: 9:00-10:00

## 2024-06-27 ENCOUNTER — APPOINTMENT (OUTPATIENT)
Dept: AUDIOLOGY | Facility: CLINIC | Age: 84
End: 2024-06-27
Payer: COMMERCIAL

## 2024-06-27 DIAGNOSIS — H90.3 SENSORINEURAL HEARING LOSS (SNHL) OF BOTH EARS: Primary | ICD-10-CM

## 2024-06-27 PROCEDURE — HRANC PR HEARING AID NO CHARGE: Performed by: AUDIOLOGIST

## 2024-06-27 NOTE — PROGRESS NOTES
HEARING AID CHECK- [facility]     RIGHT: ZACK HEARING (SIGNIA) 6 Li ADVANCED RECHARGEABLE IN-THE-CANAL AID   S.N.: [id number]  LEFT:  ZACK HEARING (SIGNIA) 6 Li ADVANCED RECHARGEABLE IN-THE-CANAL AID   S.N.: [id number]  WARRANTY EXPIRES: 6/13/2027  QUICK GUARD WAX GUARDS     The patient was accompanied by his wife today.  She reported that her  seems to be hearing very well with his new hearing aids.  She had some questions about how to clean the hearing aids.  Discussed the cleaning procedure and waxguard replacement with the patient and his wife.  The patient wanted to try to put the [facility] antony on his phone.  He has a [employer] phone that is not compatible for streaming phone calls.  He may be able to use the antony.  Unfortunately, the patient forgot his cell phone today.  He will reschedule a time to try to connect to the [facility] antony and go over how to make adjustments using the antony.       Appointment time: 9:30-10:00

## 2024-07-02 ENCOUNTER — APPOINTMENT (OUTPATIENT)
Dept: DERMATOLOGY | Facility: CLINIC | Age: 84
End: 2024-07-02
Payer: COMMERCIAL

## 2024-07-02 DIAGNOSIS — D22.9 BENIGN NEVUS: ICD-10-CM

## 2024-07-02 DIAGNOSIS — L81.4 LENTIGO: ICD-10-CM

## 2024-07-02 DIAGNOSIS — L82.1 SEBORRHEIC KERATOSIS: ICD-10-CM

## 2024-07-02 DIAGNOSIS — D18.01 ANGIOMA OF SKIN: Primary | ICD-10-CM

## 2024-07-02 DIAGNOSIS — L90.5 SCAR CONDITIONS AND FIBROSIS OF SKIN: ICD-10-CM

## 2024-07-02 DIAGNOSIS — Z85.828 HISTORY OF NONMELANOMA SKIN CANCER: ICD-10-CM

## 2024-07-02 DIAGNOSIS — L57.0 ACTINIC KERATOSIS: ICD-10-CM

## 2024-07-02 DIAGNOSIS — L57.9 SKIN CHANGES DUE TO CHRONIC EXPOSURE TO NONIONIZING RADIATION: ICD-10-CM

## 2024-07-02 PROCEDURE — 1159F MED LIST DOCD IN RCRD: CPT | Performed by: NURSE PRACTITIONER

## 2024-07-02 PROCEDURE — 99203 OFFICE O/P NEW LOW 30 MIN: CPT | Performed by: NURSE PRACTITIONER

## 2024-07-02 PROCEDURE — 1036F TOBACCO NON-USER: CPT | Performed by: NURSE PRACTITIONER

## 2024-07-02 RX ORDER — FLUOROURACIL 50 MG/G
CREAM TOPICAL
Qty: 40 G | Refills: 0 | Status: SHIPPED | OUTPATIENT
Start: 2024-07-02

## 2024-07-02 NOTE — PROGRESS NOTES
Subjective     Skip Selby is a 83 y.o. male who presents for the following: Skin Check.     Review of Systems:  No other skin or systemic complaints other than what is documented elsewhere in the note.    The following portions of the chart were reviewed this encounter and updated as appropriate:          Skin Cancer History  No skin cancer on file.      Specialty Problems    None       Objective   Well appearing patient in no apparent distress; mood and affect are within normal limits.    A full examination was performed including scalp, head, eyes, ears, nose, lips, neck, chest, axillae, abdomen, back, buttocks, bilateral upper extremities, bilateral lower extremities, hands, feet, fingers, toes, fingernails, and toenails. All findings within normal limits unless otherwise noted below.      Assessment/Plan   1. Angioma of skin  Scattered cherry-red papule(s).    A cherry hemangioma is a small macule (small, flat, smooth area) or papule (small, solid bump) formed from an overgrowth of tiny blood vessels in the skin. Cherry hemangiomas are characteristically red or purplish in color. They often first appear in middle adulthood and usually increase in number with age. Cherry hemangiomas are noncancerous (benign) and are common in adults.    The present appearance of the lesion is not worrisome but it should continue to be observed and testing/treatment may be warranted if change occurs.    2. Benign nevus  Scattered, uniform and benign-appearing, regular brown melanocytic papules and macules.    The present appearance of the lesion is not worrisome but it should continue to be observed and testing/treatment may be warranted if change occurs.    3. Seborrheic keratosis  Stuck on verrucous, tan-brown papules and plaques.      Seborrheic keratoses are common noncancerous (benign) growths of unknown cause seen in adults due to a thickening of an area of the top skin layer. Seborrheic keratoses may appear as if they  are stuck on to the skin. They have distinct borders, and they may appear as papules (small, solid bumps) or plaques (solid, raised patches that are bigger than a thumbnail). They may be the same color as your skin, or they may be pink, light brown, darker brown, or very dark brown, or sometimes may appear black.    There is no way to prevent new seborrheic keratoses from forming. Seborrheic keratoses can be removed, but removal is considered a cosmetic issue and is usually not covered by insurance.    PLAN  No treatment is needed unless there is irritation from clothing, such as itching or bleeding.  2.   Some lotions containing alpha hydroxy acids, salicylic acid, or urea may make the areas feel smoother with regular use but will not eliminate them.    4. Lentigo  Scattered tan macules in sun-exposed areas.    A solar lentigo (plural, solar lentigines), also known as a sun-induced freckle or senile lentigo, is a dark (hyperpigmented) lesion caused by natural or artificial ultraviolet (UV) light. Solar lentigines may be single or multiple. This type of lentigo is different from a simple lentigo (lentigo simplex) because it is caused by exposure to UV light. Solar lentigines are benign, but they do indicate excessive sun exposure, a risk factor for the development of skin cancer.    To prevent solar lentigines, avoid exposure to sunlight in midday (10 AM to 3 PM), wear sun-protective clothing (tightly woven clothes and hats), and apply sunscreen (SPF 30 UVA and UVB block).    The present appearance of the lesion is not worrisome but it should continue to be observed and testing/treatment may be warranted if change occurs.    5. Scar conditions and fibrosis of skin  Left Hand - Posterior  Well healed scar at the site(s) of prior treatment with no evidence of recurrence.          The scar is clear, there is no evidence of recurrence.  The present appearance of the scar is not worrisome but it should continue to be  observed and testing/treatment may be warranted if change occurs.      6. History of nonmelanoma skin cancer    ABCDEs of melanoma and atypical moles were discussed with the patient.    Patient was instructed to perform monthly self skin examination.  We recommended that the patient have regular full skin exams given an increased risk of subsequent skin cancers.    The patient was instructed to use sun protective behaviors including use of broad spectrum sunscreens and sun protective clothing to reduce risk of skin cancers.    Warning signs of non-melanoma skin cancer discussed.    7. Skin changes due to chronic exposure to nonionizing radiation  Generalized.  Actinic changes in the form of freckles, lentigines and hyper/hypopigmentation     ABCDEs of melanoma and atypical moles were discussed with the patient.    Patient was instructed to perform monthly self skin examination.  We recommended that the patient have regular full skin exams given an increased risk of subsequent skin cancers.    The patient was instructed to use sun protective behaviors including use of broad spectrum sunscreens and sun protective clothing to reduce risk of skin cancers.    Warning signs of non-melanoma skin cancer discussed.    8. Actinic keratosis  Forehead, scalp, ears  Thin erythematous papules with gritty scale    WHAT IS ACTINIC KERATOSIS?   - Actinic keratosis (AK) is a skin condition caused by sun damage. It causes scaly, rough, or bumpy spots on the skin.  - If left alone, AKs may turn into a skin cancer. People who burn easily or have trouble tanning are at more risk for developing AKs.   - There is no one test for AKs and diagnosis is made by clinical appearance. Treatment options include cryotherapy, therapy with lights, and various creams (e.g., topical 5-fluorocuracil, imiquimod).       To lower the chance of getting AK, you can:       ?  Stay out of the sun in the middle of the day (from 10 a.m. to 4 p.m.)       ?  Wear  sunscreen - An SPF of at least 30 is best. The SPF number is on the sunscreen bottle or tube.       ?  Wear a wide-brimmed hat, long-sleeved shirt, long pants, or long skirt outside. A baseball hat does not give much protection.        ?  Do not use tanning beds.        ?  Keep a low-fat diet, less than 21% of calories should come from fat       ?  Take Vitamin B3 (nicotinomide) 500mg twice daily.      YOUR TREATMENT PLAN  - At this time I recommend treatment with topical 5-Fluorouracil cream BID x2 weeks. He will start early October and follow up in Mid December 2024 for recheck.   - Provided application handout on topical 5-Fluorouracil cream and reviewed in detail.  - The patient expressed understanding, is in agreement with this plan, and wishes to proceed.    Related Medications  fluorouracil (Efudex) 5 % cream  Apply to affected areas as directed (following handout provided) twice daily for 2 weeks.        Return to clinic in December 2024 s/p efudex and 1 year for skin check/follow up or sooner if needed

## 2024-07-02 NOTE — PATIENT INSTRUCTIONS
Noted. See scanned fax for response. Protecting Your Skin from the Sun     The sun’s rays contain ultraviolet (UV) radiation that can damage our skin. There is no “safe” ultraviolet ray. Even on cloudy days, UV radiation reaches the earth and can cause skin damage. Ultraviolet A (UVA) is primarily responsible for premature aging, wrinkles, and tanning, while ultraviolet B (UVB) causes sunburns. Both types can severely damage the skin and cause skin cancer.    Sun exposure is the most preventable risk factor for all skin cancers, including melanoma. You can still have fun in the sun and decrease your risk for skin cancer.     Apply water-resistant sunscreen generously with a Sun Protection Factor (SPF) of at least 30 that provides broad-spectrum protection from both ultraviolet A (UVA) and ultraviolet B (UVB) rays to all exposed skin. Re-apply every two hours, even on cloudy days, and after sweating or swimming. Sunscreens are not perfect because some ultraviolet light may still get through sunscreens, so they should not be used as a way of prolonging sun exposure.  Seek shade when appropriate, remembering that the sun’s rays are the strongest between 10 AM and 4 PM.  Wear sun protective clothing such as a long-sleeved shirt, pants, a wide-brimmed hat, and sunglasses, when possible.  Some sunlight will get through your clothing.  You can wear sunscreen underneath, or you can buy clothing that has been treated to give additional sun protection.  Such clothing will have a UPF rating on the label.  (e.g., www.[employer], www.[employer].[place])  Protect children from sun exposure by having them play in the shade, dressing them in protective clothing, and applying sunscreen.  Use extra precaution when near water, snow, and sand.  These surfaces reflect the damaging rays of the sun and can increase your chance of sunburn.  Get vitamin D safely through a healthy diet that may include vitamin supplements.  Don’t seek the sun for vitamin D.  Avoid tanning  beds. Ultraviolet light from the sun and tanning beds can cause wrinkling and skin cancer. If you want to look like you’ve been in the sun, consider using a sunless self-tanning product, but continue to use sunscreen with it.  Perform a regular self skin exam.  If you notice anything changing, growing, or bleeding on your skin, see a dermatologist.  Skin cancer is very treatable when caught early.    Examples of good broad-spectrum sunscreens:  Blue Lizard  Coppertone Spectra 3  [place] Block  Neutrogena Ultra Sheer Dry Touch  Vanicream  Solbar  Total Block/Cotz  Elta MD    What the active ingredients do:  UVB blockers:  padimate O homosalate, octyl methoxycinnamate, benzophenone octyl salicylate, phenylbenzimadazole sulfonic acid, octocrylene  UVA blockers:  oxybenzone, avobenzone (Parsol 1789)  Physical blockers:  titanium dioxide, zinc oxide (These are chemical-free sunscreens that reflect both UVA and UVB. These may be safest if you are allergic to some sunscreens. These may also be better for sensitive skin.)

## 2024-08-06 ENCOUNTER — HOSPITAL ENCOUNTER (OUTPATIENT)
Facility: HOSPITAL | Age: 84
Setting detail: OUTPATIENT SURGERY
End: 2024-08-06
Attending: INTERNAL MEDICINE | Admitting: INTERNAL MEDICINE
Payer: MEDICARE

## 2024-08-12 ENCOUNTER — ANESTHESIA EVENT (OUTPATIENT)
Dept: OPERATING ROOM | Facility: HOSPITAL | Age: 84
End: 2024-08-12

## 2024-08-12 ENCOUNTER — PRE-ADMISSION TESTING (OUTPATIENT)
Dept: PREADMISSION TESTING | Facility: HOSPITAL | Age: 84
End: 2024-08-12
Payer: MEDICARE

## 2024-08-12 VITALS — BODY MASS INDEX: 31.39 KG/M2 | HEIGHT: 67 IN | WEIGHT: 200 LBS

## 2024-08-12 PROBLEM — I47.10 PAROXYSMAL SVT (SUPRAVENTRICULAR TACHYCARDIA) (CMS-HCC): Status: ACTIVE | Noted: 2024-08-12

## 2024-08-12 RX ORDER — LORATADINE 10 MG/1
10 TABLET ORAL DAILY
COMMUNITY

## 2024-08-12 RX ORDER — OXYBUTYNIN CHLORIDE 5 MG/1
5 TABLET ORAL 2 TIMES DAILY PRN
COMMUNITY

## 2024-08-12 RX ORDER — DIGOXIN 125 MCG
1 TABLET ORAL
COMMUNITY
Start: 2024-07-24

## 2024-08-12 RX ORDER — BISMUTH SUBSALICYLATE 262 MG
1 TABLET,CHEWABLE ORAL DAILY
COMMUNITY

## 2024-08-12 RX ORDER — GLUCOSAMINE/CHONDRO SU A 500-400 MG
1 TABLET ORAL DAILY
COMMUNITY

## 2024-08-12 ASSESSMENT — DUKE ACTIVITY SCORE INDEX (DASI)
CAN YOU WALK INDOORS, SUCH AS AROUND YOUR HOUSE: YES
CAN YOU RUN A SHORT DISTANCE: YES
CAN YOU TAKE CARE OF YOURSELF (EAT, DRESS, BATHE, OR USE TOILET): YES
CAN YOU DO YARD WORK LIKE RAKING LEAVES, WEEDING OR PUSHING A MOWER: YES
CAN YOU CLIMB A FLIGHT OF STAIRS OR WALK UP A HILL: YES
CAN YOU WALK A BLOCK OR TWO ON LEVEL GROUND: YES
CAN YOU PARTICIPATE IN MODERATE RECREATIONAL ACTIVITIES LIKE GOLF, BOWLING, DANCING, DOUBLES TENNIS OR THROWING A BASEBALL OR FOOTBALL: NO
CAN YOU DO MODERATE WORK AROUND THE HOUSE LIKE VACUUMING, SWEEPING FLOORS OR CARRYING GROCERIES: YES
CAN YOU PARTICIPATE IN STRENOUS SPORTS LIKE SWIMMING, SINGLES TENNIS, FOOTBALL, BASKETBALL, OR SKIING: NO
CAN YOU DO LIGHT WORK AROUND THE HOUSE LIKE DUSTING OR WASHING DISHES: YES
CAN YOU DO HEAVY WORK AROUND THE HOUSE LIKE SCRUBBING FLOORS OR LIFTING AND MOVING HEAVY FURNITURE: YES

## 2024-08-12 NOTE — PREPROCEDURE INSTRUCTIONS
Medication List            Accurate as of August 12, 2024  1:25 PM. Always use your most recent med list.                amLODIPine 2.5 mg tablet  Commonly known as: Norvasc  Medication Adjustments for Surgery: Take morning of surgery with sip of water, no other fluids     aspirin 81 mg EC tablet  Medication Adjustments for Surgery: Continue until night before surgery     Claritin 10 mg tablet  Generic drug: loratadine  Medication Adjustments for Surgery: Continue until night before surgery     Crestor 10 mg tablet  Generic drug: rosuvastatin  Medication Adjustments for Surgery: Continue until night before surgery     digoxin 125 MCG tablet  Commonly known as: Lanoxin  Medication Adjustments for Surgery: Take morning of surgery with sip of water, no other fluids     Eliquis 5 mg tablet  Generic drug: apixaban  Medication Adjustments for Surgery: Take morning of surgery with sip of water, no other fluids     fluorouracil 5 % cream  Commonly known as: Efudex  Apply to affected areas as directed (following handout provided) twice daily for 2 weeks.  Medication Adjustments for Surgery: Continue until night before surgery     furosemide 20 mg tablet  Commonly known as: Lasix  Take 1 tablet (20 mg) by mouth once daily.  Medication Adjustments for Surgery: Continue until night before surgery     glucosamine-chondroitin 500-400 mg tablet  Medication Adjustments for Surgery: Continue until night before surgery     losartan 100 mg tablet  Commonly known as: Cozaar  Medication Adjustments for Surgery: Take morning of surgery with sip of water, no other fluids     metoprolol succinate XL 25 mg 24 hr tablet  Commonly known as: Toprol-XL  Medication Adjustments for Surgery: Take morning of surgery with sip of water, no other fluids     multivitamin tablet  Medication Adjustments for Surgery: Continue until night before surgery     oxybutynin 5 mg tablet  Commonly known as: Ditropan  Medication Adjustments for Surgery: Continue  until night before surgery     pantoprazole 40 mg EC tablet  Commonly known as: ProtoNix  Take 1 tablet (40 mg) by mouth once daily. Do not crush, chew, or split. Do not start before December 29, 2023.  Medication Adjustments for Surgery: Continue until night before surgery                              NPO Instructions:    Do not eat any food after midnight the night before your surgery/procedure.  May have clears up to 3 hours preop. Water, Black coffee, tea, gatorade, and clear soda.   Remember to stop drinking by mouth 3 hours prior to procedure. No gum, candy, mints or smoking.      Additional Instructions:     Review your medication instructions, take indicated medications  Wear  comfortable loose fitting clothing  All jewelry and valuables should be left at home    Park in back of hospital by ER. Come up to Second floor-Outpt dept to check in.  Bring Photo ID and Insurance card,   You MUST have a  with you.  No more than 2 visitors with you please.      If you get ill at all before your procedure- CALL YOUR DOCTOR/SURGEON.  We want you in the best shape that is possible. Any sickness might lead to your procedure being delayed.      Call Outpatient dept at [phone] the night before your procedure (Friday for Monday procedure), between 1-3 pm.

## 2024-08-12 NOTE — ANESTHESIA PREPROCEDURE EVALUATION
Patient: Skip Selby    Procedure Information       Date/Time: 08/19/24 1339    Procedure: Cardioversion    Location: [facility]    Surgeons: Last Duron MD            Relevant Problems   Cardiac   (+) Benign essential hypertension   (+) Coronary artery disease involving native coronary artery of native heart without angina pectoris   (+) Paroxysmal SVT (supraventricular tachycardia) (CMS-HCC)   (+) Paroxysmal atrial fibrillation (Multi)   (+) Pure hypercholesterolemia      /Renal   (+) Benign prostatic hyperplasia with lower urinary tract symptoms      Musculoskeletal   (+) Cervical spondylosis   (+) DDD (degenerative disc disease), lumbar   (+) Osteoarthritis of hips, bilateral       Clinical information reviewed:                 Echo 8/9/23-  EF 60%  LVH with diastolic dysfunction  Mild aortic insufficiency, Mild MR/TR    LHC 9/14/22  Mild LAD disease 70% stenosis (medical management)    There were no vitals filed for this visit.    Past Surgical History:   Procedure Laterality Date    BLADDER SURGERY      CATARACT EXTRACTION      CHOLECYSTECTOMY      OTHER SURGICAL HISTORY  02/26/2021    Gallbladder surgery    SKIN BIOPSY      TONSILLECTOMY       Past Medical History:   Diagnosis Date    Coronary artery disease     Hyperlipidemia     Hypertension     Multiple rib fractures involving first rib 12/25/2023    Other specified health status     No known health problems    Overactive bladder      No current facility-administered medications for this encounter.    Current Outpatient Medications:     amLODIPine (Norvasc) 2.5 mg tablet, Take 1 tablet (2.5 mg) by mouth once every 24 hours., Disp: , Rfl:     aspirin 81 mg EC tablet, Take 1 tablet (81 mg) by mouth once daily., Disp: , Rfl:     Crestor 10 mg tablet, Take 1 tablet (10 mg) by mouth once every 24 hours., Disp: , Rfl:     Eliquis 5 mg tablet, Take 1 tablet (5 mg) by mouth every 12 hours., Disp: , Rfl:     fluorouracil (Efudex) 5 %  cream, Apply to affected areas as directed (following handout provided) twice daily for 2 weeks., Disp: 40 g, Rfl: 0    furosemide (Lasix) 20 mg tablet, Take 1 tablet (20 mg) by mouth once daily., Disp: 30 tablet, Rfl: 0    losartan (Cozaar) 100 mg tablet, Take 1 tablet (100 mg) by mouth once every 24 hours., Disp: , Rfl:     metoprolol succinate XL (Toprol-XL) 25 mg 24 hr tablet, Take 1 tablet (25 mg) by mouth once daily., Disp: , Rfl:     pantoprazole (ProtoNix) 40 mg EC tablet, Take 1 tablet (40 mg) by mouth once daily. Do not crush, chew, or split. Do not start before December 29, 2023., Disp: , Rfl:   Prior to Admission medications    Medication Sig Start Date End Date Taking? Authorizing Provider   amLODIPine (Norvasc) 2.5 mg tablet Take 1 tablet (2.5 mg) by mouth once every 24 hours. 3/14/23   Historical Provider, MD   aspirin 81 mg EC tablet Take 1 tablet (81 mg) by mouth once daily.    Historical Provider, MD   Crestor 10 mg tablet Take 1 tablet (10 mg) by mouth once every 24 hours. 6/28/22   Historical Provider, MD   Eliquis 5 mg tablet Take 1 tablet (5 mg) by mouth every 12 hours.    Historical Provider, MD   fluorouracil (Efudex) 5 % cream Apply to affected areas as directed (following handout provided) twice daily for 2 weeks. 7/2/24   JI Graham-CNP   furosemide (Lasix) 20 mg tablet Take 1 tablet (20 mg) by mouth once daily. 1/17/24 1/16/25  Teto Box DO   losartan (Cozaar) 100 mg tablet Take 1 tablet (100 mg) by mouth once every 24 hours. 12/12/22   Historical Provider, MD   metoprolol succinate XL (Toprol-XL) 25 mg 24 hr tablet Take 1 tablet (25 mg) by mouth once daily. 8/30/23   Historical Provider, MD   pantoprazole (ProtoNix) 40 mg EC tablet Take 1 tablet (40 mg) by mouth once daily. Do not crush, chew, or split. Do not start before December 29, 2023. 12/29/23   Eufemia Zrikem, APRN-CNP     Allergies   Allergen Reactions    Penicillins Headache and Unknown     headache     "Tamsulosin Other     Social History     Tobacco Use    Smoking status: Former     Types: Cigarettes    Smokeless tobacco: Never   Substance Use Topics    Alcohol use: Not Currently         Chemistry    Lab Results   Component Value Date/Time     12/27/2023 0817    K 3.8 12/27/2023 0817     12/27/2023 0817    CO2 23 (L) 12/27/2023 0817    BUN 25 12/27/2023 0817    CREATININE 0.80 12/27/2023 0817    Lab Results   Component Value Date/Time    CALCIUM 8.7 12/27/2023 0817    ALKPHOS 46 12/25/2023 0235    AST 24 12/25/2023 0235    ALT 25 12/25/2023 0235    BILITOT 0.7 12/25/2023 0235          Lab Results   Component Value Date/Time    WBC 9.2 12/27/2023 0817    HGB 15.3 12/27/2023 0817    HCT 44.2 12/27/2023 0817     (L) 12/27/2023 0817     No results found for: \"PROTIME\", \"PTT\", \"INR\"  No results found for this or any previous visit (from the past 4464 hour(s)).  No results found for this or any previous visit from the past 1095 days.      NPO Detail:  No data recorded     PHYSICAL EXAM    Anesthesia Plan  "

## 2024-08-14 RX ORDER — ACETAMINOPHEN 325 MG/1
650 TABLET ORAL EVERY 4 HOURS PRN
OUTPATIENT
Start: 2024-08-14

## 2024-08-14 RX ORDER — SODIUM CHLORIDE, SODIUM LACTATE, POTASSIUM CHLORIDE, CALCIUM CHLORIDE 600; 310; 30; 20 MG/100ML; MG/100ML; MG/100ML; MG/100ML
30 INJECTION, SOLUTION INTRAVENOUS CONTINUOUS
OUTPATIENT
Start: 2024-08-14

## 2024-08-14 RX ORDER — ONDANSETRON HYDROCHLORIDE 2 MG/ML
4 INJECTION, SOLUTION INTRAVENOUS ONCE AS NEEDED
OUTPATIENT
Start: 2024-08-14

## 2024-08-14 RX ORDER — SODIUM CHLORIDE, SODIUM LACTATE, POTASSIUM CHLORIDE, CALCIUM CHLORIDE 600; 310; 30; 20 MG/100ML; MG/100ML; MG/100ML; MG/100ML
100 INJECTION, SOLUTION INTRAVENOUS CONTINUOUS
OUTPATIENT
Start: 2024-08-14

## 2024-08-19 ENCOUNTER — LAB (OUTPATIENT)
Dept: LAB | Facility: LAB | Age: 84
End: 2024-08-19
Payer: MEDICARE

## 2024-08-19 ENCOUNTER — ANESTHESIA (OUTPATIENT)
Dept: OPERATING ROOM | Facility: HOSPITAL | Age: 84
End: 2024-08-19

## 2024-08-19 DIAGNOSIS — I48.0 PAF (PAROXYSMAL ATRIAL FIBRILLATION) (MULTI): ICD-10-CM

## 2024-08-19 DIAGNOSIS — I25.119 ATHEROSCLEROSIS OF NATIVE CORONARY ARTERY OF NATIVE HEART WITH ANGINA PECTORIS (CMS-HCC): ICD-10-CM

## 2024-08-19 DIAGNOSIS — Z79.01 LONG TERM (CURRENT) USE OF ANTICOAGULANTS: ICD-10-CM

## 2024-08-19 DIAGNOSIS — I10 BENIGN ESSENTIAL HYPERTENSION: ICD-10-CM

## 2024-08-19 DIAGNOSIS — I25.10 CORONARY ARTERY DISEASE INVOLVING NATIVE CORONARY ARTERY OF NATIVE HEART WITHOUT ANGINA PECTORIS: ICD-10-CM

## 2024-08-19 DIAGNOSIS — N40.1 BENIGN PROSTATIC HYPERPLASIA WITH LOWER URINARY TRACT SYMPTOMS, SYMPTOM DETAILS UNSPECIFIED: ICD-10-CM

## 2024-08-19 DIAGNOSIS — R73.09 ELEVATED GLUCOSE: ICD-10-CM

## 2024-08-19 DIAGNOSIS — R73.09 ELEVATED GLUCOSE: Primary | ICD-10-CM

## 2024-08-19 DIAGNOSIS — Z00.00 ROUTINE GENERAL MEDICAL EXAMINATION AT A HEALTH CARE FACILITY: ICD-10-CM

## 2024-08-19 LAB
ALBUMIN SERPL-MCNC: 4.2 G/DL (ref 3.5–5)
ALP BLD-CCNC: 55 U/L (ref 35–125)
ALT SERPL-CCNC: 27 U/L (ref 5–40)
ANION GAP SERPL CALC-SCNC: 12 MMOL/L
APTT PPP: 27.3 SECONDS (ref 22–32.5)
AST SERPL-CCNC: 27 U/L (ref 5–40)
BASOPHILS # BLD AUTO: 0.05 X10*3/UL (ref 0–0.1)
BASOPHILS NFR BLD AUTO: 0.7 %
BILIRUB SERPL-MCNC: 0.4 MG/DL (ref 0.1–1.2)
BUN SERPL-MCNC: 24 MG/DL (ref 8–25)
CALCIUM SERPL-MCNC: 9.1 MG/DL (ref 8.5–10.4)
CHLORIDE SERPL-SCNC: 104 MMOL/L (ref 97–107)
CHOLEST SERPL-MCNC: 116 MG/DL (ref 133–200)
CHOLEST/HDLC SERPL: 2.8 {RATIO}
CO2 SERPL-SCNC: 25 MMOL/L (ref 24–31)
CREAT SERPL-MCNC: 0.8 MG/DL (ref 0.4–1.6)
EGFRCR SERPLBLD CKD-EPI 2021: 88 ML/MIN/1.73M*2
EOSINOPHIL # BLD AUTO: 0.22 X10*3/UL (ref 0–0.4)
EOSINOPHIL NFR BLD AUTO: 3 %
ERYTHROCYTE [DISTWIDTH] IN BLOOD BY AUTOMATED COUNT: 12.3 % (ref 11.5–14.5)
EST. AVERAGE GLUCOSE BLD GHB EST-MCNC: 128 MG/DL
GLUCOSE SERPL-MCNC: 113 MG/DL (ref 65–99)
HBA1C MFR BLD: 6.1 %
HCT VFR BLD AUTO: 45.8 % (ref 41–52)
HDLC SERPL-MCNC: 42 MG/DL
HGB BLD-MCNC: 15.2 G/DL (ref 13.5–17.5)
IMM GRANULOCYTES # BLD AUTO: 0.03 X10*3/UL (ref 0–0.5)
IMM GRANULOCYTES NFR BLD AUTO: 0.4 % (ref 0–0.9)
INR PPP: 1.1 (ref 0.9–1.2)
LDLC SERPL CALC-MCNC: 47 MG/DL (ref 65–130)
LYMPHOCYTES # BLD AUTO: 3.43 X10*3/UL (ref 0.8–3)
LYMPHOCYTES NFR BLD AUTO: 46.4 %
MCH RBC QN AUTO: 31.8 PG (ref 26–34)
MCHC RBC AUTO-ENTMCNC: 33.2 G/DL (ref 32–36)
MCV RBC AUTO: 96 FL (ref 80–100)
MONOCYTES # BLD AUTO: 0.6 X10*3/UL (ref 0.05–0.8)
MONOCYTES NFR BLD AUTO: 8.1 %
NEUTROPHILS # BLD AUTO: 3.06 X10*3/UL (ref 1.6–5.5)
NEUTROPHILS NFR BLD AUTO: 41.4 %
NRBC BLD-RTO: 0 /100 WBCS (ref 0–0)
PLATELET # BLD AUTO: 171 X10*3/UL (ref 150–450)
POTASSIUM SERPL-SCNC: 4.6 MMOL/L (ref 3.4–5.1)
PROT SERPL-MCNC: 6.5 G/DL (ref 5.9–7.9)
PROTHROMBIN TIME: 12 SECONDS (ref 9.3–12.7)
PSA SERPL-MCNC: 5.6 NG/ML
RBC # BLD AUTO: 4.78 X10*6/UL (ref 4.5–5.9)
SODIUM SERPL-SCNC: 141 MMOL/L (ref 133–145)
TRIGL SERPL-MCNC: 134 MG/DL (ref 40–150)
TSH SERPL DL<=0.05 MIU/L-ACNC: 2.7 MIU/L (ref 0.27–4.2)
WBC # BLD AUTO: 7.4 X10*3/UL (ref 4.4–11.3)

## 2024-08-19 PROCEDURE — 85610 PROTHROMBIN TIME: CPT

## 2024-08-19 PROCEDURE — 80053 COMPREHEN METABOLIC PANEL: CPT

## 2024-08-19 PROCEDURE — 84443 ASSAY THYROID STIM HORMONE: CPT

## 2024-08-19 PROCEDURE — 83036 HEMOGLOBIN GLYCOSYLATED A1C: CPT

## 2024-08-19 PROCEDURE — 85025 COMPLETE CBC W/AUTO DIFF WBC: CPT

## 2024-08-19 PROCEDURE — 36415 COLL VENOUS BLD VENIPUNCTURE: CPT

## 2024-08-19 PROCEDURE — 80061 LIPID PANEL: CPT

## 2024-08-19 PROCEDURE — 85730 THROMBOPLASTIN TIME PARTIAL: CPT

## 2024-08-19 PROCEDURE — 84153 ASSAY OF PSA TOTAL: CPT

## 2024-08-22 ENCOUNTER — HOSPITAL ENCOUNTER (OUTPATIENT)
Dept: RADIOLOGY | Facility: HOSPITAL | Age: 84
Discharge: HOME | End: 2024-08-22
Payer: MEDICARE

## 2024-08-22 VITALS
OXYGEN SATURATION: 99 % | RESPIRATION RATE: 16 BRPM | SYSTOLIC BLOOD PRESSURE: 141 MMHG | DIASTOLIC BLOOD PRESSURE: 60 MMHG | HEART RATE: 60 BPM

## 2024-08-22 DIAGNOSIS — I25.119 ATHEROSCLEROSIS OF NATIVE CORONARY ARTERY OF NATIVE HEART WITH ANGINA PECTORIS (CMS-HCC): ICD-10-CM

## 2024-08-22 PROCEDURE — 2550000001 HC RX 255 CONTRASTS: Performed by: INTERNAL MEDICINE

## 2024-08-22 PROCEDURE — 75574 CT ANGIO HRT W/3D IMAGE: CPT

## 2024-08-22 PROCEDURE — 2500000001 HC RX 250 WO HCPCS SELF ADMINISTERED DRUGS (ALT 637 FOR MEDICARE OP): Performed by: STUDENT IN AN ORGANIZED HEALTH CARE EDUCATION/TRAINING PROGRAM

## 2024-08-22 RX ORDER — LORAZEPAM 2 MG/ML
0.5 INJECTION INTRAMUSCULAR EVERY 5 MIN PRN
Status: DISCONTINUED | OUTPATIENT
Start: 2024-08-22 | End: 2024-08-23 | Stop reason: HOSPADM

## 2024-08-22 RX ORDER — METOPROLOL TARTRATE 1 MG/ML
5 INJECTION, SOLUTION INTRAVENOUS ONCE AS NEEDED
Status: DISCONTINUED | OUTPATIENT
Start: 2024-08-22 | End: 2024-08-23 | Stop reason: HOSPADM

## 2024-08-22 RX ORDER — METOPROLOL TARTRATE 100 MG/1
100 TABLET ORAL ONCE
Status: DISCONTINUED | OUTPATIENT
Start: 2024-08-22 | End: 2024-08-23 | Stop reason: HOSPADM

## 2024-08-22 RX ORDER — METOPROLOL TARTRATE 1 MG/ML
5 INJECTION, SOLUTION INTRAVENOUS ONCE
Status: DISCONTINUED | OUTPATIENT
Start: 2024-08-22 | End: 2024-08-23 | Stop reason: HOSPADM

## 2024-08-22 RX ORDER — METOPROLOL TARTRATE 100 MG/1
100 TABLET ORAL ONCE AS NEEDED
Status: DISCONTINUED | OUTPATIENT
Start: 2024-08-22 | End: 2024-08-23 | Stop reason: HOSPADM

## 2024-08-22 RX ORDER — NITROGLYCERIN 0.4 MG/1
0.8 TABLET SUBLINGUAL ONCE
Status: COMPLETED | OUTPATIENT
Start: 2024-08-22 | End: 2024-08-22

## 2024-09-11 PROBLEM — H61.20 IMPACTED CERUMEN: Status: ACTIVE | Noted: 2024-09-11

## 2024-09-11 PROBLEM — H90.3 ASYMMETRICAL SENSORINEURAL HEARING LOSS: Status: ACTIVE | Noted: 2024-09-11

## 2024-09-11 PROBLEM — M16.9 OSTEOARTHRITIS OF HIP: Status: ACTIVE | Noted: 2024-09-11

## 2024-09-13 PROBLEM — H61.20 IMPACTED CERUMEN: Status: RESOLVED | Noted: 2024-09-11 | Resolved: 2024-09-13

## 2024-09-16 ENCOUNTER — OFFICE VISIT (OUTPATIENT)
Dept: PRIMARY CARE | Facility: CLINIC | Age: 84
End: 2024-09-16
Payer: MEDICARE

## 2024-09-16 VITALS
OXYGEN SATURATION: 97 % | WEIGHT: 204.4 LBS | BODY MASS INDEX: 32.08 KG/M2 | HEIGHT: 67 IN | TEMPERATURE: 95.9 F | SYSTOLIC BLOOD PRESSURE: 144 MMHG | DIASTOLIC BLOOD PRESSURE: 80 MMHG | HEART RATE: 62 BPM

## 2024-09-16 DIAGNOSIS — R97.20 ELEVATED PSA: ICD-10-CM

## 2024-09-16 DIAGNOSIS — I48.0 PAROXYSMAL ATRIAL FIBRILLATION (MULTI): ICD-10-CM

## 2024-09-16 DIAGNOSIS — I10 BENIGN ESSENTIAL HYPERTENSION: ICD-10-CM

## 2024-09-16 DIAGNOSIS — I25.10 CORONARY ARTERY DISEASE INVOLVING NATIVE CORONARY ARTERY OF NATIVE HEART WITHOUT ANGINA PECTORIS: ICD-10-CM

## 2024-09-16 DIAGNOSIS — R73.03 PREDIABETES: ICD-10-CM

## 2024-09-16 DIAGNOSIS — Z00.00 ROUTINE GENERAL MEDICAL EXAMINATION AT HEALTH CARE FACILITY: Primary | ICD-10-CM

## 2024-09-16 PROCEDURE — 99213 OFFICE O/P EST LOW 20 MIN: CPT | Performed by: FAMILY MEDICINE

## 2024-09-16 PROCEDURE — 1159F MED LIST DOCD IN RCRD: CPT | Performed by: FAMILY MEDICINE

## 2024-09-16 PROCEDURE — 1160F RVW MEDS BY RX/DR IN RCRD: CPT | Performed by: FAMILY MEDICINE

## 2024-09-16 PROCEDURE — 1170F FXNL STATUS ASSESSED: CPT | Performed by: FAMILY MEDICINE

## 2024-09-16 PROCEDURE — G0439 PPPS, SUBSEQ VISIT: HCPCS | Performed by: FAMILY MEDICINE

## 2024-09-16 PROCEDURE — 1123F ACP DISCUSS/DSCN MKR DOCD: CPT | Performed by: FAMILY MEDICINE

## 2024-09-16 PROCEDURE — 1036F TOBACCO NON-USER: CPT | Performed by: FAMILY MEDICINE

## 2024-09-16 PROCEDURE — 99215 OFFICE O/P EST HI 40 MIN: CPT | Performed by: FAMILY MEDICINE

## 2024-09-16 PROCEDURE — 1158F ADVNC CARE PLAN TLK DOCD: CPT | Performed by: FAMILY MEDICINE

## 2024-09-16 PROCEDURE — 1126F AMNT PAIN NOTED NONE PRSNT: CPT | Performed by: FAMILY MEDICINE

## 2024-09-16 PROCEDURE — 3077F SYST BP >= 140 MM HG: CPT | Performed by: FAMILY MEDICINE

## 2024-09-16 PROCEDURE — 3079F DIAST BP 80-89 MM HG: CPT | Performed by: FAMILY MEDICINE

## 2024-09-16 ASSESSMENT — PATIENT HEALTH QUESTIONNAIRE - PHQ9
SUM OF ALL RESPONSES TO PHQ9 QUESTIONS 1 AND 2: 0
2. FEELING DOWN, DEPRESSED OR HOPELESS: NOT AT ALL
1. LITTLE INTEREST OR PLEASURE IN DOING THINGS: NOT AT ALL

## 2024-09-16 ASSESSMENT — LIFESTYLE VARIABLES
HOW OFTEN DURING THE LAST YEAR HAVE YOU FOUND THAT YOU WERE NOT ABLE TO STOP DRINKING ONCE YOU HAD STARTED: NEVER
HAVE YOU OR SOMEONE ELSE BEEN INJURED AS A RESULT OF YOUR DRINKING: NO
SKIP TO QUESTIONS 9-10: 1
AUDIT TOTAL SCORE: 1
HOW OFTEN DURING THE LAST YEAR HAVE YOU FAILED TO DO WHAT WAS NORMALLY EXPECTED FROM YOU BECAUSE OF DRINKING: NEVER
HOW OFTEN DURING THE LAST YEAR HAVE YOU BEEN UNABLE TO REMEMBER WHAT HAPPENED THE NIGHT BEFORE BECAUSE YOU HAD BEEN DRINKING: NEVER
HOW OFTEN DO YOU HAVE A DRINK CONTAINING ALCOHOL: MONTHLY OR LESS
HOW MANY STANDARD DRINKS CONTAINING ALCOHOL DO YOU HAVE ON A TYPICAL DAY: 1 OR 2
HOW OFTEN DURING THE LAST YEAR HAVE YOU HAD A FEELING OF GUILT OR REMORSE AFTER DRINKING: NEVER
HAS A RELATIVE, FRIEND, DOCTOR, OR ANOTHER HEALTH PROFESSIONAL EXPRESSED CONCERN ABOUT YOUR DRINKING OR SUGGESTED YOU CUT DOWN: NO
AUDIT-C TOTAL SCORE: 1
HOW OFTEN DO YOU HAVE SIX OR MORE DRINKS ON ONE OCCASION: NEVER
HOW OFTEN DURING THE LAST YEAR HAVE YOU NEEDED AN ALCOHOLIC DRINK FIRST THING IN THE MORNING TO GET YOURSELF GOING AFTER A NIGHT OF HEAVY DRINKING: NEVER

## 2024-09-16 ASSESSMENT — ENCOUNTER SYMPTOMS
HYPERTENSION: 1
OCCASIONAL FEELINGS OF UNSTEADINESS: 0
LOSS OF SENSATION IN FEET: 0
DEPRESSION: 0

## 2024-09-16 ASSESSMENT — ACTIVITIES OF DAILY LIVING (ADL)
BATHING: INDEPENDENT
TAKING_MEDICATION: INDEPENDENT
DRESSING: INDEPENDENT
DOING_HOUSEWORK: INDEPENDENT
MANAGING_FINANCES: INDEPENDENT
GROCERY_SHOPPING: INDEPENDENT

## 2024-09-16 ASSESSMENT — PAIN SCALES - GENERAL: PAINLEVEL: 0-NO PAIN

## 2024-09-16 NOTE — PROGRESS NOTES
"Subjective   Reason for Visit: Skip Selby is an 83 y.o. male here for a Medicare Wellness visit.     Past Medical, Surgical, and Family History reviewed and updated in chart.    Reviewed all medications by prescribing practitioner or clinical pharmacist (such as prescriptions, OTCs, herbal therapies and supplements) and documented in the medical record.    Here for medicare physical.       CAD  -Doing well.  Still on crestor  No new issues.    -Specialist: Dr. Norton    Hypertension  -Patient is here for follow-up of elevated blood pressure.  No new changes or issues.  Tolerating medications well.    -Blood pressure is well controlled.  -Cardiac symptoms: none.   -Patient denies chest pain, dyspnea, and irregular heart beat.   -Cardiologist: Dr. Norton    Atrial Fibrillation  -F/U: doing well.  No new concerns.  Tolerating medications well. No issues with bleeding, bruising.    -Anticoagulation: Eliquis - tolerating well.    -Cardiologist:  Dr. Norton        Hypertension        Patient Care Team:  Teto Box DO as PCP - General  Teto Box DO as Primary Care Provider  Arnulfo Norton MD as Consulting Physician (Cardiology)     Review of Systems    Objective   Vitals:  /80 (BP Location: Right arm, Patient Position: Sitting)   Pulse 62   Temp 35.5 °C (95.9 °F) (Temporal)   Ht 1.702 m (5' 7\")   Wt 92.7 kg (204 lb 6.4 oz)   SpO2 97%   BMI 32.01 kg/m²       Physical Exam  Vitals reviewed.   Constitutional:       General: He is not in acute distress.  Cardiovascular:      Rate and Rhythm: Normal rate and regular rhythm.   Pulmonary:      Effort: Pulmonary effort is normal.      Breath sounds: No wheezing or rhonchi.   Musculoskeletal:      Right lower leg: No edema.      Left lower leg: No edema.   Lymphadenopathy:      Cervical: No cervical adenopathy.   Neurological:      Mental Status: He is alert.         Assessment & Plan  Routine general medical examination at health care facility     "     Benign essential hypertension          Paroxysmal atrial fibrillation (Multi)         Coronary artery disease involving native coronary artery of native heart without angina pectoris         Elevated PSA         Prediabetes           Patient Instructions   Patient is here for medicare physical.  Up to date on immunizations.      Blood work shows elevated PSA - recheck in 3 months - if elevated we will see urology    For elevated glucose - pt has prediabetes.  A1c is 6.1.  Recommend exercise, whole food.  Limit processed foods.  Recheck in 3 months.     For blood pressure - well controlled.  Continue current regimen    Recheck  in 3 months - blood work prior.

## 2024-09-16 NOTE — PATIENT INSTRUCTIONS
Patient is here for medicare physical.  Up to date on immunizations.      Blood work shows elevated PSA - recheck in 3 months - if elevated we will see urology    For elevated glucose - pt has prediabetes.  A1c is 6.1.  Recommend exercise, whole food.  Limit processed foods.  Recheck in 3 months.     For blood pressure - well controlled.  Continue current regimen    Recheck  in 3 months - blood work prior.

## 2024-12-12 ENCOUNTER — LAB (OUTPATIENT)
Dept: LAB | Facility: LAB | Age: 84
End: 2024-12-12
Payer: MEDICARE

## 2024-12-12 DIAGNOSIS — R73.03 PREDIABETES: ICD-10-CM

## 2024-12-12 DIAGNOSIS — R97.20 ELEVATED PSA: ICD-10-CM

## 2024-12-12 LAB
EST. AVERAGE GLUCOSE BLD GHB EST-MCNC: 114 MG/DL
HBA1C MFR BLD: 5.6 %
PSA SERPL-MCNC: 5.38 NG/ML

## 2024-12-12 PROCEDURE — 84153 ASSAY OF PSA TOTAL: CPT

## 2024-12-12 PROCEDURE — 83036 HEMOGLOBIN GLYCOSYLATED A1C: CPT

## 2024-12-12 PROCEDURE — 36415 COLL VENOUS BLD VENIPUNCTURE: CPT

## 2024-12-16 ENCOUNTER — OFFICE VISIT (OUTPATIENT)
Dept: PRIMARY CARE | Facility: CLINIC | Age: 84
End: 2024-12-16
Payer: MEDICARE

## 2024-12-16 VITALS
TEMPERATURE: 98.4 F | HEART RATE: 73 BPM | OXYGEN SATURATION: 96 % | DIASTOLIC BLOOD PRESSURE: 74 MMHG | SYSTOLIC BLOOD PRESSURE: 120 MMHG

## 2024-12-16 DIAGNOSIS — I48.0 PAROXYSMAL ATRIAL FIBRILLATION (MULTI): ICD-10-CM

## 2024-12-16 DIAGNOSIS — R73.09 ELEVATED GLUCOSE: ICD-10-CM

## 2024-12-16 DIAGNOSIS — I10 BENIGN ESSENTIAL HYPERTENSION: Primary | ICD-10-CM

## 2024-12-16 DIAGNOSIS — R97.20 ELEVATED PSA: ICD-10-CM

## 2024-12-16 DIAGNOSIS — I25.10 CORONARY ARTERY DISEASE INVOLVING NATIVE CORONARY ARTERY OF NATIVE HEART WITHOUT ANGINA PECTORIS: ICD-10-CM

## 2024-12-16 PROCEDURE — 1160F RVW MEDS BY RX/DR IN RCRD: CPT | Performed by: FAMILY MEDICINE

## 2024-12-16 PROCEDURE — G2211 COMPLEX E/M VISIT ADD ON: HCPCS | Performed by: FAMILY MEDICINE

## 2024-12-16 PROCEDURE — 3078F DIAST BP <80 MM HG: CPT | Performed by: FAMILY MEDICINE

## 2024-12-16 PROCEDURE — 99214 OFFICE O/P EST MOD 30 MIN: CPT | Performed by: FAMILY MEDICINE

## 2024-12-16 PROCEDURE — 1159F MED LIST DOCD IN RCRD: CPT | Performed by: FAMILY MEDICINE

## 2024-12-16 PROCEDURE — 3074F SYST BP LT 130 MM HG: CPT | Performed by: FAMILY MEDICINE

## 2024-12-16 PROCEDURE — 1036F TOBACCO NON-USER: CPT | Performed by: FAMILY MEDICINE

## 2024-12-16 PROCEDURE — 1126F AMNT PAIN NOTED NONE PRSNT: CPT | Performed by: FAMILY MEDICINE

## 2024-12-16 RX ORDER — OXYBUTYNIN CHLORIDE 5 MG/1
5 TABLET ORAL 2 TIMES DAILY
Qty: 180 TABLET | Refills: 1 | Status: SHIPPED | OUTPATIENT
Start: 2024-12-16

## 2024-12-16 ASSESSMENT — PATIENT HEALTH QUESTIONNAIRE - PHQ9
2. FEELING DOWN, DEPRESSED OR HOPELESS: NOT AT ALL
1. LITTLE INTEREST OR PLEASURE IN DOING THINGS: NOT AT ALL
SUM OF ALL RESPONSES TO PHQ9 QUESTIONS 1 & 2: 0

## 2024-12-16 ASSESSMENT — ENCOUNTER SYMPTOMS
DEPRESSION: 0
OCCASIONAL FEELINGS OF UNSTEADINESS: 0
HYPERTENSION: 1
LOSS OF SENSATION IN FEET: 0

## 2024-12-16 ASSESSMENT — LIFESTYLE VARIABLES
HOW MANY STANDARD DRINKS CONTAINING ALCOHOL DO YOU HAVE ON A TYPICAL DAY: 1 OR 2
AUDIT-C TOTAL SCORE: 1
HOW OFTEN DO YOU HAVE A DRINK CONTAINING ALCOHOL: MONTHLY OR LESS
SKIP TO QUESTIONS 9-10: 1
HOW OFTEN DO YOU HAVE SIX OR MORE DRINKS ON ONE OCCASION: NEVER

## 2024-12-16 ASSESSMENT — PAIN SCALES - GENERAL: PAINLEVEL_OUTOF10: 0-NO PAIN

## 2024-12-16 NOTE — PATIENT INSTRUCTIONS
Here for follow up.     For afib - stable on eliquis - tolerating well    For eye - pt has subconjunctival hemorrhage on left.  Benign finding.  Can use systane eye drops (OTC) if irritated.     For glucose levels - A1c is much improved. Back to normal at 5.6. Continue mindful eating.      For elevated PSA and increased urinary symptoms - recommend opinion from urology.

## 2024-12-16 NOTE — PROGRESS NOTES
Subjective   Patient ID: Skip Selby is a 84 y.o. male who presents for 3 MO F/U.    Here for follow up.     Red Eye  -Pt states started 1 week ago.  Saw eye specialist.  Does not affect his vision.  Told to use eye drops.    -Specialist:    Dr. Berumen    CAD  -Doing well.  Still on crestor  No new issues.    -Specialist: Dr. Norton    Hypertension  -Patient is here for follow-up of elevated blood pressure.  No new changes or issues.  Tolerating medications well.    -Blood pressure is well controlled.  -Cardiac symptoms: none.   -Patient denies chest pain, dyspnea, and irregular heart beat.   -Cardiologist: Dr. Norton    Atrial Fibrillation  -F/U: doing well.  No new concerns.  Tolerating medications well. No issues with bleeding, bruising.    -Anticoagulation: Eliquis - tolerating well.    -Cardiologist:  Dr. Norton    Elevated A1c  -A1c is improved.  Down to 5.6 from 6.1.     Elevated PSA  -Pt had recheck.  Pt was on flomax - side effects.  Increased frequency, increased nocturia.          Hypertension         Review of Systems    Objective   /74 (BP Location: Right arm, Patient Position: Sitting)   Pulse 73   Temp 36.9 °C (98.4 °F) (Temporal)   SpO2 96%     Physical Exam  Vitals reviewed.   Constitutional:       General: He is not in acute distress.  HENT:      Right Ear: Tympanic membrane and ear canal normal.      Left Ear: Tympanic membrane and ear canal normal.   Eyes:      Conjunctiva/sclera:      Left eye: Hemorrhage present.   Cardiovascular:      Rate and Rhythm: Normal rate and regular rhythm.   Pulmonary:      Effort: Pulmonary effort is normal.      Breath sounds: No wheezing or rhonchi.   Musculoskeletal:      Right lower leg: No edema.      Left lower leg: No edema.   Lymphadenopathy:      Cervical: No cervical adenopathy.   Neurological:      Mental Status: He is alert.         Assessment/Plan   Diagnoses and all orders for this visit:  Benign essential hypertension  Paroxysmal atrial  fibrillation (Multi)  Coronary artery disease involving native coronary artery of native heart without angina pectoris  Elevated glucose  Elevated PSA    Patient Instructions   Here for follow up.     For afib - stable on eliquis - tolerating well    For eye - pt has subconjunctival hemorrhage on left.  Benign finding.  Can use systane eye drops (OTC) if irritated.     For glucose levels - A1c is much improved. Back to normal at 5.6. Continue mindful eating.      For elevated PSA and increased urinary symptoms - recommend opinion from urology.       Follow up as scheduled.

## 2025-01-21 ENCOUNTER — APPOINTMENT (OUTPATIENT)
Dept: DERMATOLOGY | Facility: CLINIC | Age: 85
End: 2025-01-21
Payer: COMMERCIAL

## 2025-01-21 DIAGNOSIS — L57.0 ACTINIC KERATOSIS: Primary | ICD-10-CM

## 2025-01-21 PROCEDURE — 1159F MED LIST DOCD IN RCRD: CPT | Performed by: NURSE PRACTITIONER

## 2025-01-21 PROCEDURE — 17003 DESTRUCT PREMALG LES 2-14: CPT | Performed by: NURSE PRACTITIONER

## 2025-01-21 PROCEDURE — 17000 DESTRUCT PREMALG LESION: CPT | Performed by: NURSE PRACTITIONER

## 2025-01-21 PROCEDURE — 1160F RVW MEDS BY RX/DR IN RCRD: CPT | Performed by: NURSE PRACTITIONER

## 2025-01-21 PROCEDURE — 1036F TOBACCO NON-USER: CPT | Performed by: NURSE PRACTITIONER

## 2025-01-21 NOTE — PROGRESS NOTES
Subjective     Skip Selby is a 84 y.o. male who presents for the following: Efudex follow up.     Review of Systems:  No other skin or systemic complaints other than what is documented elsewhere in the note.    The following portions of the chart were reviewed this encounter and updated as appropriate:   Tobacco  Allergies  Meds  Problems  Med Hx  Surg Hx         Skin Cancer History  No skin cancer on file.      Specialty Problems    None       Objective   Well appearing patient in no apparent distress; mood and affect are within normal limits.    A focused skin examination was performed. All findings within normal limits unless otherwise noted below.    Assessment/Plan   1. Actinic keratosis (2)  Left Forehead, Mid Forehead  Thin erythematous papules with gritty scale    This is a 84 y.o. male  following up for s/p efudex. 2 AK remaining following efudex. Patient in agreement to treat remaining lesions with LN2. .          YOUR TREATMENT PLAN  - At this time I recommend treatment with cryotherapy.  - Possible side effects of liquid nitrogen treatment reviewed including formation of blisters, crusting, tenderness, scar, and discoloration which may be permanent.  - Patient advised to return the office for re-evaluation if the treated lesion(s) do not resolve within 4-6 weeks. Patient verbalizes understanding.    Destr of lesion - Left Forehead, Mid Forehead  Complexity: simple    Destruction method: cryotherapy    Informed consent: discussed and consent obtained    Timeout:  patient name, date of birth, surgical site, and procedure verified  Lesion destroyed using liquid nitrogen: Yes    Cryotherapy cycles:  2  Outcome: patient tolerated procedure well with no complications    Post-procedure details: wound care instructions given          Return in August-September 2025 for routine skin check or return to clinic sooner if needed

## 2025-02-22 LAB
ALBUMIN SERPL-MCNC: 4.2 G/DL (ref 3.6–5.1)
ALP SERPL-CCNC: 38 U/L (ref 35–144)
ALT SERPL-CCNC: 23 U/L (ref 9–46)
ANION GAP SERPL CALCULATED.4IONS-SCNC: 10 MMOL/L (CALC) (ref 7–17)
AST SERPL-CCNC: 21 U/L (ref 10–35)
BASOPHILS # BLD AUTO: 39 CELLS/UL (ref 0–200)
BASOPHILS NFR BLD AUTO: 0.5 %
BILIRUB SERPL-MCNC: 0.7 MG/DL (ref 0.2–1.2)
BUN SERPL-MCNC: 26 MG/DL (ref 7–25)
CALCIUM SERPL-MCNC: 9.3 MG/DL (ref 8.6–10.3)
CHLORIDE SERPL-SCNC: 107 MMOL/L (ref 98–110)
CHOLEST SERPL-MCNC: 121 MG/DL
CHOLEST/HDLC SERPL: 2.6 (CALC)
CO2 SERPL-SCNC: 24 MMOL/L (ref 20–32)
CREAT SERPL-MCNC: 0.92 MG/DL (ref 0.7–1.22)
EGFRCR SERPLBLD CKD-EPI 2021: 83 ML/MIN/1.73M2
EOSINOPHIL # BLD AUTO: 270 CELLS/UL (ref 15–500)
EOSINOPHIL NFR BLD AUTO: 3.5 %
ERYTHROCYTE [DISTWIDTH] IN BLOOD BY AUTOMATED COUNT: 12.2 % (ref 11–15)
EST. AVERAGE GLUCOSE BLD GHB EST-MCNC: 131 MG/DL
EST. AVERAGE GLUCOSE BLD GHB EST-SCNC: 7.3 MMOL/L
GLUCOSE SERPL-MCNC: 100 MG/DL (ref 65–99)
HBA1C MFR BLD: 6.2 % OF TOTAL HGB
HCT VFR BLD AUTO: 46.9 % (ref 38.5–50)
HDLC SERPL-MCNC: 46 MG/DL
HGB BLD-MCNC: 15.5 G/DL (ref 13.2–17.1)
LDLC SERPL CALC-MCNC: 58 MG/DL (CALC)
LYMPHOCYTES # BLD AUTO: 3450 CELLS/UL (ref 850–3900)
LYMPHOCYTES NFR BLD AUTO: 44.8 %
MCH RBC QN AUTO: 31.4 PG (ref 27–33)
MCHC RBC AUTO-ENTMCNC: 33 G/DL (ref 32–36)
MCV RBC AUTO: 94.9 FL (ref 80–100)
MONOCYTES # BLD AUTO: 693 CELLS/UL (ref 200–950)
MONOCYTES NFR BLD AUTO: 9 %
NEUTROPHILS # BLD AUTO: 3249 CELLS/UL (ref 1500–7800)
NEUTROPHILS NFR BLD AUTO: 42.2 %
NONHDLC SERPL-MCNC: 75 MG/DL (CALC)
PLATELET # BLD AUTO: 155 THOUSAND/UL (ref 140–400)
PMV BLD REES-ECKER: 11 FL (ref 7.5–12.5)
POTASSIUM SERPL-SCNC: 4.2 MMOL/L (ref 3.5–5.3)
PROT SERPL-MCNC: 6.5 G/DL (ref 6.1–8.1)
PSA SERPL-MCNC: 5.41 NG/ML
RBC # BLD AUTO: 4.94 MILLION/UL (ref 4.2–5.8)
SODIUM SERPL-SCNC: 141 MMOL/L (ref 135–146)
TRIGL SERPL-MCNC: 85 MG/DL
TSH SERPL-ACNC: 3.08 MIU/L (ref 0.4–4.5)
WBC # BLD AUTO: 7.7 THOUSAND/UL (ref 3.8–10.8)

## 2025-02-25 ENCOUNTER — APPOINTMENT (OUTPATIENT)
Dept: UROLOGY | Facility: CLINIC | Age: 85
End: 2025-02-25
Payer: MEDICARE

## 2025-02-25 DIAGNOSIS — R39.14 BENIGN PROSTATIC HYPERPLASIA WITH INCOMPLETE BLADDER EMPTYING: Primary | ICD-10-CM

## 2025-02-25 DIAGNOSIS — R97.20 ELEVATED PSA: ICD-10-CM

## 2025-02-25 DIAGNOSIS — N40.1 BENIGN PROSTATIC HYPERPLASIA WITH INCOMPLETE BLADDER EMPTYING: Primary | ICD-10-CM

## 2025-02-25 PROCEDURE — 99203 OFFICE O/P NEW LOW 30 MIN: CPT | Performed by: SURGERY

## 2025-02-25 PROCEDURE — 1036F TOBACCO NON-USER: CPT | Performed by: SURGERY

## 2025-02-25 PROCEDURE — 1159F MED LIST DOCD IN RCRD: CPT | Performed by: SURGERY

## 2025-02-25 PROCEDURE — 1160F RVW MEDS BY RX/DR IN RCRD: CPT | Performed by: SURGERY

## 2025-02-25 PROCEDURE — 1126F AMNT PAIN NOTED NONE PRSNT: CPT | Performed by: SURGERY

## 2025-02-25 RX ORDER — SILODOSIN 8 MG/1
8 CAPSULE ORAL
Qty: 30 CAPSULE | Refills: 0 | Status: SHIPPED | OUTPATIENT
Start: 2025-02-25

## 2025-02-25 ASSESSMENT — PAIN SCALES - GENERAL: PAINLEVEL_OUTOF10: 0-NO PAIN

## 2025-02-25 NOTE — PROGRESS NOTES
Subjective   Patient ID: Skip Selby is a 84 y.o. male who presents for Establish Care and Elevated PSA.    HPI  He was referred by his primary doctor.  He has had lower urinary tract symptoms for several years.  He is bothered by urgency and incomplete emptying.  He has been on oxybutynin for 1 or 2 years.  This helps with his urgency.  His AUA symptom score is 26.  He denies any dysuria or hematuria.  He also was noted to have an elevated PSA.  His PSA was noted to be 5.4.  He has no history of bladder infections.  He has tried Flomax in the past, however he developed side effects.        Review of Systems  As per HPI, remaining 10 systems negative          Objective   Physical Exam  Well nourished  Breathing comfortably  Abdomen obese, soft, ND, NT              Assessment/Plan   Problem List Items Addressed This Visit             ICD-10-CM       Genitourinary and Reproductive    Benign prostatic hyperplasia with lower urinary tract symptoms - Primary N40.1    Relevant Medications    silodosin (Rapaflo) 8 mg capsule     Other Visit Diagnoses         Codes    Elevated PSA     R97.20          BPH.  We discussed the natural history of BPH.  I counseled him on treatment options.  He would like to try another alpha-blocker.  I will prescribe Rapaflo 8 mg daily.  He will try this for 1 month.    Elevated PSA.  We discussed PSA and its role in prostate cancer screening.  Given his advanced age his PSA value is acceptable.  He does not need any further surveillance or investigation.             Agus Adam MD 02/25/25 10:42 AM

## 2025-02-28 ENCOUNTER — TELEPHONE (OUTPATIENT)
Dept: AUDIOLOGY | Facility: CLINIC | Age: 85
End: 2025-02-28
Payer: MEDICARE

## 2025-02-28 ENCOUNTER — DOCUMENTATION (OUTPATIENT)
Dept: AUDIOLOGY | Facility: CLINIC | Age: 85
End: 2025-02-28
Payer: MEDICARE

## 2025-02-28 NOTE — TELEPHONE ENCOUNTER
Returning call from Belen; unsure if hearing aids are working and would like to have them evaluated.

## 2025-02-28 NOTE — PROGRESS NOTES
Name:  Skip Selby  :  1940  Age:  84 y.o.  Date of Service:  2025    Mr. Selby's right hearing aid is no longer producing appropriate sound. Hearing aid is being sent to [facility] for in warranty repair. When hearing aid returns from repair Mr. Selby's [facility] Patient will be leaving [facility] 3/7 for a month and may be unable to pickup repair until then

## 2025-03-05 ENCOUNTER — OFFICE VISIT (OUTPATIENT)
Dept: PRIMARY CARE | Facility: CLINIC | Age: 85
End: 2025-03-05
Payer: MEDICARE

## 2025-03-05 VITALS
DIASTOLIC BLOOD PRESSURE: 70 MMHG | TEMPERATURE: 98.4 F | OXYGEN SATURATION: 95 % | SYSTOLIC BLOOD PRESSURE: 118 MMHG | HEART RATE: 67 BPM | WEIGHT: 206.6 LBS | BODY MASS INDEX: 32.36 KG/M2 | RESPIRATION RATE: 18 BRPM

## 2025-03-05 DIAGNOSIS — E78.00 PURE HYPERCHOLESTEROLEMIA: ICD-10-CM

## 2025-03-05 DIAGNOSIS — I25.10 CORONARY ARTERY DISEASE INVOLVING NATIVE CORONARY ARTERY OF NATIVE HEART WITHOUT ANGINA PECTORIS: ICD-10-CM

## 2025-03-05 DIAGNOSIS — I10 BENIGN ESSENTIAL HYPERTENSION: Primary | ICD-10-CM

## 2025-03-05 DIAGNOSIS — I48.0 PAROXYSMAL ATRIAL FIBRILLATION (MULTI): ICD-10-CM

## 2025-03-05 PROCEDURE — 3078F DIAST BP <80 MM HG: CPT | Performed by: FAMILY MEDICINE

## 2025-03-05 PROCEDURE — 99214 OFFICE O/P EST MOD 30 MIN: CPT | Performed by: FAMILY MEDICINE

## 2025-03-05 PROCEDURE — 3074F SYST BP LT 130 MM HG: CPT | Performed by: FAMILY MEDICINE

## 2025-03-05 PROCEDURE — 1036F TOBACCO NON-USER: CPT | Performed by: FAMILY MEDICINE

## 2025-03-05 PROCEDURE — 1159F MED LIST DOCD IN RCRD: CPT | Performed by: FAMILY MEDICINE

## 2025-03-05 PROCEDURE — 1126F AMNT PAIN NOTED NONE PRSNT: CPT | Performed by: FAMILY MEDICINE

## 2025-03-05 PROCEDURE — G2211 COMPLEX E/M VISIT ADD ON: HCPCS | Performed by: FAMILY MEDICINE

## 2025-03-05 ASSESSMENT — ENCOUNTER SYMPTOMS
OCCASIONAL FEELINGS OF UNSTEADINESS: 0
LOSS OF SENSATION IN FEET: 0
HYPERTENSION: 1
DEPRESSION: 0

## 2025-03-05 ASSESSMENT — PAIN SCALES - GENERAL: PAINLEVEL_OUTOF10: 0-NO PAIN

## 2025-03-05 NOTE — PROGRESS NOTES
Subjective   Patient ID: Skip Selby is a 84 y.o. male who presents for Hypertension.    Here for follow up.     CAD  -Doing well.  Still on crestor. Tolerating well.   -Specialist: Dr. Norton    Hypertension  -Patient is here for follow-up of elevated blood pressure.  Well controlled on current medication.    -Blood pressure is well controlled.  -Cardiac symptoms: none.   -Patient denies chest pain, dyspnea, and irregular heart beat.   -Cardiologist: Dr. Norton    Atrial Fibrillation  -F/U: doing well.  No new concerns.  Tolerating medications well. No issues with bleeding, bruising.  Using digoxin.  Tolerating well.  More active.    -Anticoagulation: Eliquis - tolerating well.    -Cardiologist:  Dr. Norton    Elevated A1c  -A1c is improved.  Down to 5.6 from 6.1.     Elevated PSA  -Pt had recheck.  Pt was on flomax - side effects.  Started on rapaflo.  Starting to get benefit.  Discussed urolift.          Hypertension         Review of Systems    Objective   /70   Pulse 67   Temp 36.9 °C (98.4 °F) (Temporal)   Resp 18   Wt 93.7 kg (206 lb 9.6 oz)   SpO2 95%   BMI 32.36 kg/m²     Physical Exam  Vitals reviewed.   Constitutional:       General: He is not in acute distress.  Cardiovascular:      Rate and Rhythm: Normal rate and regular rhythm.   Pulmonary:      Effort: Pulmonary effort is normal.      Breath sounds: No wheezing or rhonchi.   Musculoskeletal:      Right lower leg: No edema.      Left lower leg: No edema.   Lymphadenopathy:      Cervical: No cervical adenopathy.   Neurological:      Mental Status: He is alert.         Assessment/Plan

## 2025-03-05 NOTE — PATIENT INSTRUCTIONS
Here for follow up.      For blood pressure - well controlled.      For coronary artery disease - continue aspirin and crestor.  Seeing Dr. Norton    For atrial fibrillation - continue digoxin and eliquis.      For urinary issues - continue rapaflo.  If dizziness occurs we will decrease losartan or stop amlodipine.     Follow up in 6 months.

## 2025-03-12 ENCOUNTER — TELEPHONE (OUTPATIENT)
Dept: AUDIOLOGY | Facility: CLINIC | Age: 85
End: 2025-03-12
Payer: MEDICARE

## 2025-03-17 ENCOUNTER — APPOINTMENT (OUTPATIENT)
Dept: PRIMARY CARE | Facility: CLINIC | Age: 85
End: 2025-03-17
Payer: MEDICARE

## 2025-04-02 ENCOUNTER — NURSE TRIAGE (OUTPATIENT)
Dept: AUDIOLOGY | Facility: CLINIC | Age: 85
End: 2025-04-02
Payer: MEDICARE

## 2025-04-02 NOTE — TELEPHONE ENCOUNTER
Initiate Call notes copied by Zora Cloud on Wednesday April 02, 2025  1:14 PM  ------  Documentation by Zora Cloud. [[id number]] 4/2/2025 10:51 AM  Belen (wife) would like to pick hearing aids up tomorrow.     L/m returning phone call. A time should be set up to save settings on hearing aid pair.    Consent (Scalp)/Introductory Paragraph: The rationale for Mohs was explained to the patient and consent was obtained. The risks, benefits and alternatives to therapy were discussed in detail. Specifically, the risks of changes in hair growth pattern secondary to repair, infection, scarring, bleeding, prolonged wound healing, incomplete removal, allergy to anesthesia, nerve injury and recurrence were addressed. Prior to the procedure, the treatment site was clearly identified and confirmed by the patient. All components of Universal Protocol/PAUSE Rule completed.

## 2025-04-16 ENCOUNTER — OFFICE VISIT (OUTPATIENT)
Dept: UROLOGY | Facility: CLINIC | Age: 85
End: 2025-04-16
Payer: MEDICARE

## 2025-04-16 DIAGNOSIS — N40.1 BENIGN PROSTATIC HYPERPLASIA WITH INCOMPLETE BLADDER EMPTYING: Primary | ICD-10-CM

## 2025-04-16 DIAGNOSIS — R39.14 BENIGN PROSTATIC HYPERPLASIA WITH INCOMPLETE BLADDER EMPTYING: Primary | ICD-10-CM

## 2025-04-16 PROCEDURE — G2211 COMPLEX E/M VISIT ADD ON: HCPCS | Performed by: SURGERY

## 2025-04-16 PROCEDURE — 1160F RVW MEDS BY RX/DR IN RCRD: CPT | Performed by: SURGERY

## 2025-04-16 PROCEDURE — 1159F MED LIST DOCD IN RCRD: CPT | Performed by: SURGERY

## 2025-04-16 PROCEDURE — 1126F AMNT PAIN NOTED NONE PRSNT: CPT | Performed by: SURGERY

## 2025-04-16 PROCEDURE — 1036F TOBACCO NON-USER: CPT | Performed by: SURGERY

## 2025-04-16 PROCEDURE — 99212 OFFICE O/P EST SF 10 MIN: CPT | Performed by: SURGERY

## 2025-04-16 ASSESSMENT — PAIN SCALES - GENERAL: PAINLEVEL_OUTOF10: 0-NO PAIN

## 2025-04-16 NOTE — PROGRESS NOTES
I spoke with the patient regarding her positive blood cultures.  Patient was in the emergency department on 10/17/2021 with pyelonephritis and a leukocytosis of 18,000.  It was recommended that she stay in the hospital but patient preferred to go home at that time.  She tells me that she helps care for her family member's children and could not stay overnight in the hospital yesterday.  I emphasized the importance of returning to the emergency department for evaluation of bloodstream infection in setting of kidney infection.  She states she no longer has pain with urination, but feels weak, is having a hard time standing, and feels that when she walks she \"starts to go down.\" I recommend patient called 911. She states that she will speak with her daughter 1st to see if she can get a ride.  She plans to return to the emergency department as soon as possible. Subjective   Patient ID: Skip Selby is a 84 y.o. male who presents for Follow-up (Pt states he did not notice much difference with rapaflo. ).      HPI  He is here for follow-up.  He has known BPH with LUTS.  He started Rapaflo 2 months ago.  He has noticed some improvement in his symptoms.  His nocturia episodes have decreased.  His stream is about the same.  He does feel more empty after voiding.            Objective   Physical Exam  Well nourished  Abdomen soft, ND, NT              Assessment/Plan   Problem List Items Addressed This Visit           ICD-10-CM       Genitourinary and Reproductive    Benign prostatic hyperplasia with lower urinary tract symptoms - Primary N40.1    Relevant Orders    Cystoscopy        Clinically he has had improvement with Rapaflo.  However he is interested in minimally invasive options.  I will schedule him for cystoscopy for further evaluation.            Agus Adam MD 04/16/25 10:59 AM

## 2025-04-22 ENCOUNTER — APPOINTMENT (OUTPATIENT)
Dept: UROLOGY | Facility: CLINIC | Age: 85
End: 2025-04-22
Payer: MEDICARE

## 2025-04-24 ENCOUNTER — APPOINTMENT (OUTPATIENT)
Dept: UROLOGY | Facility: CLINIC | Age: 85
End: 2025-04-24
Payer: MEDICARE

## 2025-04-24 VITALS
HEIGHT: 67 IN | DIASTOLIC BLOOD PRESSURE: 80 MMHG | SYSTOLIC BLOOD PRESSURE: 149 MMHG | WEIGHT: 206 LBS | HEART RATE: 74 BPM | BODY MASS INDEX: 32.33 KG/M2 | TEMPERATURE: 97.5 F

## 2025-04-24 DIAGNOSIS — N40.1 BENIGN PROSTATIC HYPERPLASIA WITH INCOMPLETE BLADDER EMPTYING: Primary | ICD-10-CM

## 2025-04-24 DIAGNOSIS — R39.14 BENIGN PROSTATIC HYPERPLASIA WITH INCOMPLETE BLADDER EMPTYING: Primary | ICD-10-CM

## 2025-04-24 PROCEDURE — 52000 CYSTOURETHROSCOPY: CPT | Performed by: SURGERY

## 2025-04-24 ASSESSMENT — PAIN SCALES - GENERAL: PAINLEVEL_OUTOF10: 0-NO PAIN

## 2025-04-24 NOTE — PROGRESS NOTES
Patient ID: Skip Selby is a 84 y.o. male.  He is here for BPH evaluation.  He has worsening LUTS despite medical therapy.        Cystoscopy    Date/Time: 4/24/2025 1:26 PM    Performed by: Agus Adam MD  Authorized by: Agus Adam MD    Procedure - Bladder Cystoscopy:     Procedure details: cystoscopy    Post-procedure:     Patient tolerance: Patient tolerated the procedure well with no immediate complications      Comments:      The patient was placed in a supine position.  His genitalia were prepped and draped.  We introduced viscous lidocaine per urethra.  A cystoscope was passed per urethra, and we entered the bladder.  The orifices were identified and appeared normal with clear efflux.  The bladder mucosa was inspected.  Trabeculations and cellules seen.  No tumors or stones seen.  Prostate is enlarged, moderately obstructive.  Moderate sized median lobe.  No strictures seen.          Plan:  Schedule Green light PVP.

## 2025-04-30 DIAGNOSIS — N30.00 ACUTE CYSTITIS WITHOUT HEMATURIA: ICD-10-CM

## 2025-05-02 LAB
APPEARANCE UR: CLEAR
BACTERIA #/AREA URNS HPF: ABNORMAL /HPF
BACTERIA UR CULT: ABNORMAL
BILIRUB UR QL STRIP: NEGATIVE
COLOR UR: YELLOW
GLUCOSE UR QL STRIP: NEGATIVE
HGB UR QL STRIP: NEGATIVE
HYALINE CASTS #/AREA URNS LPF: ABNORMAL /LPF
KETONES UR QL STRIP: NEGATIVE
LEUKOCYTE ESTERASE UR QL STRIP: ABNORMAL
NITRITE UR QL STRIP: NEGATIVE
PH UR STRIP: 6 [PH] (ref 5–8)
PROT UR QL STRIP: NEGATIVE
RBC #/AREA URNS HPF: ABNORMAL /HPF
SERVICE CMNT-IMP: ABNORMAL
SP GR UR STRIP: 1.01 (ref 1–1.03)
SQUAMOUS #/AREA URNS HPF: ABNORMAL /HPF
WBC #/AREA URNS HPF: ABNORMAL /HPF

## 2025-05-03 DIAGNOSIS — N30.00 ACUTE CYSTITIS WITHOUT HEMATURIA: Primary | ICD-10-CM

## 2025-05-03 RX ORDER — CIPROFLOXACIN 500 MG/1
500 TABLET ORAL 2 TIMES DAILY
Qty: 14 TABLET | Refills: 0 | Status: SHIPPED | OUTPATIENT
Start: 2025-05-03 | End: 2025-05-10

## 2025-05-06 ENCOUNTER — PRE-ADMISSION TESTING (OUTPATIENT)
Dept: PREADMISSION TESTING | Facility: HOSPITAL | Age: 85
End: 2025-05-06
Payer: MEDICARE

## 2025-05-06 VITALS
TEMPERATURE: 98.6 F | WEIGHT: 207.9 LBS | OXYGEN SATURATION: 99 % | HEIGHT: 67 IN | BODY MASS INDEX: 32.63 KG/M2 | HEART RATE: 69 BPM | DIASTOLIC BLOOD PRESSURE: 71 MMHG | SYSTOLIC BLOOD PRESSURE: 130 MMHG

## 2025-05-06 DIAGNOSIS — Z01.818 PRE-OP EXAMINATION: Primary | ICD-10-CM

## 2025-05-06 PROCEDURE — 93010 ELECTROCARDIOGRAM REPORT: CPT | Performed by: INTERNAL MEDICINE

## 2025-05-06 PROCEDURE — 93005 ELECTROCARDIOGRAM TRACING: CPT

## 2025-05-06 ASSESSMENT — ENCOUNTER SYMPTOMS
FREQUENCY: 1
ENDOCRINE NEGATIVE: 1
CARDIOVASCULAR NEGATIVE: 1
PSYCHIATRIC NEGATIVE: 1
EYES NEGATIVE: 1
RESPIRATORY NEGATIVE: 1
CONSTITUTIONAL NEGATIVE: 1
MUSCULOSKELETAL NEGATIVE: 1
NEUROLOGICAL NEGATIVE: 1
GASTROINTESTINAL NEGATIVE: 1
ALLERGIC/IMMUNOLOGIC NEGATIVE: 1
HEMATOLOGIC/LYMPHATIC NEGATIVE: 1

## 2025-05-06 ASSESSMENT — DUKE ACTIVITY SCORE INDEX (DASI)
CAN YOU PARTICIPATE IN MODERATE RECREATIONAL ACTIVITIES LIKE GOLF, BOWLING, DANCING, DOUBLES TENNIS OR THROWING A BASEBALL OR FOOTBALL: YES
CAN YOU DO MODERATE WORK AROUND THE HOUSE LIKE VACUUMING, SWEEPING FLOORS OR CARRYING GROCERIES: YES
CAN YOU HAVE SEXUAL RELATIONS: YES
CAN YOU TAKE CARE OF YOURSELF (EAT, DRESS, BATHE, OR USE TOILET): YES
TOTAL_SCORE: 50.2
CAN YOU DO LIGHT WORK AROUND THE HOUSE LIKE DUSTING OR WASHING DISHES: YES
CAN YOU DO YARD WORK LIKE RAKING LEAVES, WEEDING OR PUSHING A MOWER: YES
CAN YOU PARTICIPATE IN STRENOUS SPORTS LIKE SWIMMING, SINGLES TENNIS, FOOTBALL, BASKETBALL, OR SKIING: YES
DASI METS SCORE: 8.9
CAN YOU RUN A SHORT DISTANCE: NO
CAN YOU CLIMB A FLIGHT OF STAIRS OR WALK UP A HILL: YES
CAN YOU DO HEAVY WORK AROUND THE HOUSE LIKE SCRUBBING FLOORS OR LIFTING AND MOVING HEAVY FURNITURE: YES
CAN YOU WALK A BLOCK OR TWO ON LEVEL GROUND: YES
CAN YOU WALK INDOORS, SUCH AS AROUND YOUR HOUSE: YES

## 2025-05-06 NOTE — CPM/PAT H&P
CPM/PAT Evaluation       Name: Skip Selby (Skip Selby)  /Age: 1940/84 y.o.     In-Person       Chief Complaint: BPH    HPI: Skip Selby is a 84 year old male with a history of BPH. He has complaint of incomplete emptying after he urinates. He denies painful urination or blood in the urine. He states he does have some urgency and frequency. He states he is up 4-5 times a night to urinate. He has has a cystoscopy that revealed an enlarged prostate with moderate obstruction. He is scheduled for a prostate ablation. He denies fever, chills, nausea, vomiting ,chest pain, sob, dizziness,and palpitations.    Past Medical History:  Diagnosis Date    Coronary artery disease     HL (hearing loss)     Hyperlipidemia     Hypertension     Multiple rib fractures involving first rib 2023    Other specified health status     No known health problems    Overactive bladder        Past Surgical History:  Procedure Laterality Date    CATARACT EXTRACTION Right     CHOLECYSTECTOMY      OTHER SURGICAL HISTORY  2021    Gallbladder surgery    SKIN BIOPSY      TONSILLECTOMY         Social History     Tobacco Use    Smoking status: Former     Current packs/day: 0.00     Average packs/day: 0.5 packs/day for 10.0 years (5.0 ttl pk-yrs)     Types: Cigarettes     Start date:      Quit date: 1970     Years since quittin.3    Smokeless tobacco: Never   Substance Use Topics    Alcohol use: Not Currently     Comment: denies     Social History     Substance and Sexual Activity   Drug Use Never         Family History[1]    Allergies[2]  Current Outpatient Medications   Medication Sig Dispense Refill    amLODIPine (Norvasc) 2.5 mg tablet Take 1 tablet (2.5 mg) by mouth once every 24 hours.      aspirin 81 mg EC tablet Take 1 tablet (81 mg) by mouth once daily.      ciprofloxacin (Cipro) 500 mg tablet Take 1 tablet (500 mg) by mouth 2 times a day for 7 days. 14 tablet 0    Crestor 10 mg tablet Take 1 tablet (10 mg)  by mouth once every 24 hours.      digoxin (Lanoxin) 125 MCG tablet Take 1 tablet (125 mcg) by mouth early in the morning..      Eliquis 5 mg tablet Take 1 tablet (5 mg) by mouth every 12 hours.      glucosamine-chondroitin 500-400 mg tablet Take 1 tablet by mouth early in the morning..      losartan (Cozaar) 100 mg tablet Take 1 tablet (100 mg) by mouth once daily in the morning.      metoprolol succinate XL (Toprol-XL) 25 mg 24 hr tablet Take 1 tablet (25 mg) by mouth once daily in the morning.      multivitamin tablet Take 1 tablet by mouth once daily.      oxybutynin (Ditropan) 5 mg tablet Take 1 tablet (5 mg) by mouth 2 times a day. (Patient taking differently: Take 1 tablet (5 mg) by mouth if needed (as needed for traveling).) 180 tablet 1     No current facility-administered medications for this visit.          Review of Systems   Constitutional: Negative.    HENT: Negative.     Eyes: Negative.    Respiratory: Negative.     Cardiovascular: Negative.    Gastrointestinal: Negative.    Endocrine: Negative.    Genitourinary:  Positive for frequency and urgency.        Nocturia   Musculoskeletal: Negative.    Skin: Negative.    Allergic/Immunologic: Negative.    Neurological: Negative.    Hematological: Negative.    Psychiatric/Behavioral: Negative.             Physical Exam  Vitals reviewed.   Constitutional:       Appearance: Normal appearance.   HENT:      Head: Normocephalic and atraumatic.      Nose: Nose normal.      Mouth/Throat:      Mouth: Mucous membranes are moist.      Pharynx: Oropharynx is clear.   Eyes:      Extraocular Movements: Extraocular movements intact.      Conjunctiva/sclera: Conjunctivae normal.   Cardiovascular:      Rate and Rhythm: Normal rate. Rhythm irregular.      Pulses: Normal pulses.   Pulmonary:      Effort: Pulmonary effort is normal.      Breath sounds: Normal breath sounds.   Abdominal:      General: Bowel sounds are normal.      Palpations: Abdomen is soft.   Genitourinary:    "  Comments: Assessment deferred to physician  Musculoskeletal:         General: Normal range of motion.      Cervical back: Normal range of motion and neck supple.   Skin:     General: Skin is warm and dry.   Neurological:      General: No focal deficit present.      Mental Status: He is alert and oriented to person, place, and time.   Psychiatric:         Mood and Affect: Mood normal.         Behavior: Behavior normal.         Thought Content: Thought content normal.         Judgment: Judgment normal.          PAT AIRWAY:   Airway:     Mallampati::  I    TM distance::  >3 FB    Neck ROM::  Full  normal          Visit Vitals  /71   Pulse 69   Temp 37 °C (98.6 °F) (Temporal)   Ht 1.702 m (5' 7\")   Wt 94.3 kg (207 lb 14.4 oz)   SpO2 99%   BMI 32.56 kg/m²   Smoking Status Former   BSA 2.11 m²   ASA: III  CHADS2: 4.0%  RCRI: 0.4%  DASI: 50.2  METS:8.9  STOP BAN            Assessment and Plan:     Benign prostatic hyperplasia with incomplete bladder emptying : ABLATION, PROSTATE, TRANSURETHRAL , Oxybutynin  Hypertension: Amlodipine, Losartan  Atrial Fibrillation: Digoxin, Eliquis, metoprolol  CAD: Follows Dr. Norton, Aspirin, Crestor. Per Dr. Norton patient is to stop aspirin 7 days before and Eliquis 3 days before  BMI: 32.56    [facility] done in [facility]  LABS: 25  Echocardiogram 8/15/24 (listed in cardiologist Dr. Norton's 3/4/25 note in media)  Normal LV chamber size  Normal left ventricular systolic function, LVEF 60-65%. Mild LVH with diastolic dysfunction, normal RV chamber size and systolic function, mild aortic insufficiency, mild mitral regurgitation, mild tricuspid regurgitation, normal pulmonary arterial systolic pressure and low normal estimated CVP.    Nancy Bedolla, APRN-CNP         [1]   Family History  Problem Relation Name Age of Onset    Alzheimer's disease Father      Alzheimer's disease Sister      Other (fall) Sister     [2]   Allergies  Allergen Reactions    Tamsulosin Other     Had chest pains    " Penicillins Headache and Unknown     headache

## 2025-05-06 NOTE — H&P (VIEW-ONLY)
CPM/PAT Evaluation       Name: Skip Selby (Skip Selby)  /Age: 1940/84 y.o.     In-Person       Chief Complaint: BPH    HPI: Skip Selby is a 84 year old male with a history of BPH. He has complaint of incomplete emptying after he urinates. He denies painful urination or blood in the urine. He states he does have some urgency and frequency. He states he is up 4-5 times a night to urinate. He has has a cystoscopy that revealed an enlarged prostate with moderate obstruction. He is scheduled for a prostate ablation. He denies fever, chills, nausea, vomiting ,chest pain, sob, dizziness,and palpitations.    Past Medical History:  Diagnosis Date    Coronary artery disease     HL (hearing loss)     Hyperlipidemia     Hypertension     Multiple rib fractures involving first rib 2023    Other specified health status     No known health problems    Overactive bladder        Past Surgical History:  Procedure Laterality Date    CATARACT EXTRACTION Right     CHOLECYSTECTOMY      OTHER SURGICAL HISTORY  2021    Gallbladder surgery    SKIN BIOPSY      TONSILLECTOMY         Social History     Tobacco Use    Smoking status: Former     Current packs/day: 0.00     Average packs/day: 0.5 packs/day for 10.0 years (5.0 ttl pk-yrs)     Types: Cigarettes     Start date:      Quit date: 1970     Years since quittin.3    Smokeless tobacco: Never   Substance Use Topics    Alcohol use: Not Currently     Comment: denies     Social History     Substance and Sexual Activity   Drug Use Never         Family History[1]    Allergies[2]  Current Outpatient Medications   Medication Sig Dispense Refill    amLODIPine (Norvasc) 2.5 mg tablet Take 1 tablet (2.5 mg) by mouth once every 24 hours.      aspirin 81 mg EC tablet Take 1 tablet (81 mg) by mouth once daily.      ciprofloxacin (Cipro) 500 mg tablet Take 1 tablet (500 mg) by mouth 2 times a day for 7 days. 14 tablet 0    Crestor 10 mg tablet Take 1 tablet (10 mg)  by mouth once every 24 hours.      digoxin (Lanoxin) 125 MCG tablet Take 1 tablet (125 mcg) by mouth early in the morning..      Eliquis 5 mg tablet Take 1 tablet (5 mg) by mouth every 12 hours.      glucosamine-chondroitin 500-400 mg tablet Take 1 tablet by mouth early in the morning..      losartan (Cozaar) 100 mg tablet Take 1 tablet (100 mg) by mouth once daily in the morning.      metoprolol succinate XL (Toprol-XL) 25 mg 24 hr tablet Take 1 tablet (25 mg) by mouth once daily in the morning.      multivitamin tablet Take 1 tablet by mouth once daily.      oxybutynin (Ditropan) 5 mg tablet Take 1 tablet (5 mg) by mouth 2 times a day. (Patient taking differently: Take 1 tablet (5 mg) by mouth if needed (as needed for traveling).) 180 tablet 1     No current facility-administered medications for this visit.          Review of Systems   Constitutional: Negative.    HENT: Negative.     Eyes: Negative.    Respiratory: Negative.     Cardiovascular: Negative.    Gastrointestinal: Negative.    Endocrine: Negative.    Genitourinary:  Positive for frequency and urgency.        Nocturia   Musculoskeletal: Negative.    Skin: Negative.    Allergic/Immunologic: Negative.    Neurological: Negative.    Hematological: Negative.    Psychiatric/Behavioral: Negative.             Physical Exam  Vitals reviewed.   Constitutional:       Appearance: Normal appearance.   HENT:      Head: Normocephalic and atraumatic.      Nose: Nose normal.      Mouth/Throat:      Mouth: Mucous membranes are moist.      Pharynx: Oropharynx is clear.   Eyes:      Extraocular Movements: Extraocular movements intact.      Conjunctiva/sclera: Conjunctivae normal.   Cardiovascular:      Rate and Rhythm: Normal rate. Rhythm irregular.      Pulses: Normal pulses.   Pulmonary:      Effort: Pulmonary effort is normal.      Breath sounds: Normal breath sounds.   Abdominal:      General: Bowel sounds are normal.      Palpations: Abdomen is soft.   Genitourinary:    "  Comments: Assessment deferred to physician  Musculoskeletal:         General: Normal range of motion.      Cervical back: Normal range of motion and neck supple.   Skin:     General: Skin is warm and dry.   Neurological:      General: No focal deficit present.      Mental Status: He is alert and oriented to person, place, and time.   Psychiatric:         Mood and Affect: Mood normal.         Behavior: Behavior normal.         Thought Content: Thought content normal.         Judgment: Judgment normal.          PAT AIRWAY:   Airway:     Mallampati::  I    TM distance::  >3 FB    Neck ROM::  Full  normal          Visit Vitals  /71   Pulse 69   Temp 37 °C (98.6 °F) (Temporal)   Ht 1.702 m (5' 7\")   Wt 94.3 kg (207 lb 14.4 oz)   SpO2 99%   BMI 32.56 kg/m²   Smoking Status Former   BSA 2.11 m²   ASA: III  CHADS2: 4.0%  RCRI: 0.4%  DASI: 50.2  METS:8.9  STOP BAN            Assessment and Plan:     Benign prostatic hyperplasia with incomplete bladder emptying : ABLATION, PROSTATE, TRANSURETHRAL , Oxybutynin  Hypertension: Amlodipine, Losartan  Atrial Fibrillation: Digoxin, Eliquis, metoprolol  CAD: Follows Dr. Norton, Aspirin, Crestor. Per Dr. Norton patient is to stop aspirin 7 days before and Eliquis 3 days before  BMI: 32.56    EKG done in [facility]  LABS: 25  Echocardiogram 8/15/24 (listed in cardiologist Dr. Norton's 3/4/25 note in media)  Normal LV chamber size  Normal left ventricular systolic function, LVEF 60-65%. Mild LVH with diastolic dysfunction, normal RV chamber size and systolic function, mild aortic insufficiency, mild mitral regurgitation, mild tricuspid regurgitation, normal pulmonary arterial systolic pressure and low normal estimated CVP.    Nancy Bedolla, APRN-CNP         [1]   Family History  Problem Relation Name Age of Onset    Alzheimer's disease Father      Alzheimer's disease Sister      Other (fall) Sister     [2]   Allergies  Allergen Reactions    Tamsulosin Other     Had chest pains    " Penicillins Headache and Unknown     headache

## 2025-05-06 NOTE — PREPROCEDURE INSTRUCTIONS
Medication List            Accurate as of May 6, 2025 12:59 PM. Always use your most recent med list.                amLODIPine 2.5 mg tablet  Commonly known as: Norvasc  Medication Adjustments for Surgery: Take on the morning of surgery     aspirin 81 mg EC tablet  Additional Medication Adjustments for Surgery: Take last dose 7 days before surgery  Notes to patient: Stop 7 days before surgery as per Dr. Norton     ciprofloxacin 500 mg tablet  Commonly known as: Cipro  Take 1 tablet (500 mg) by mouth 2 times a day for 7 days.  Medication Adjustments for Surgery: Take/Use as prescribed     Crestor 10 mg tablet  Generic drug: rosuvastatin  Medication Adjustments for Surgery: Take/Use as prescribed     digoxin 125 MCG tablet  Commonly known as: Lanoxin  Medication Adjustments for Surgery: Take on the morning of surgery     Eliquis 5 mg tablet  Generic drug: apixaban  Medication Adjustments for Surgery: Take last dose 3 days before surgery  Additional Medication Adjustments for Surgery: Other (Comment)  Notes to patient: Stop 3 days before surgery, as per Dr. Norton     glucosamine-chondroitin 500-400 mg tablet  Additional Medication Adjustments for Surgery: Take last dose 7 days before surgery     losartan 100 mg tablet  Commonly known as: Cozaar  Medication Adjustments for Surgery: Do Not take on the morning of surgery  Additional Medication Adjustments for Surgery: Other (Comment)  Notes to patient: Do not take morning of surgery     metoprolol succinate XL 25 mg 24 hr tablet  Commonly known as: Toprol-XL  Medication Adjustments for Surgery: Take on the morning of surgery     multivitamin tablet  Additional Medication Adjustments for Surgery: Take last dose 7 days before surgery     oxybutynin 5 mg tablet  Commonly known as: Ditropan  Take 1 tablet (5 mg) by mouth 2 times a day.  Medication Adjustments for Surgery: Take/Use as prescribed  Additional Medication Adjustments for Surgery: Other (Comment)  Notes to  patient: Do not take the day of surgery                          Preoperative Fasting Guidelines    Why must I stop eating and drinking near surgery time?  With sedation, food or liquid in your stomach can enter your lungs causing serious complications  Increases nausea and vomiting    When do I need to stop eating and drinking before my surgery?  Do not eat any food after midnight the night before your surgery/procedure.  You may have up to 13.5 ounces of clear liquid until TWO hours before your instructed arrival time to the hospital.  This includes water, black tea/coffee, (no milk or cream) apple juice, and electrolyte drinks (Gatorade)  You may chew gum until TWO hours before your surgery/procedure          PAT DISCHARGE INSTRUCTIONS    Please call the Same Day Surgery (SDS) Department of the hospital where your procedure will be performed after 2:00 PM the day before your surgery. If you are scheduled on a Monday, or a Tuesday following a Monday holiday, you will need to call on the last business day prior to your surgery.    [facility]  [address] [place] [address] [phone]  [facility]  [address] [place] [address], [id number] [phone]  [facility]  [address] [place] [address]  [phone]    Please let your surgeon know if:      You develop any open sores, shingles, burning or painful urination as these may increase your risk of an infection.   You no longer wish to have the surgery.   Any other personal circumstances change that may lead to the need to cancel or defer this surgery-such as being sick or getting admitted to any hospital within one week of your planned procedure.    Your contact details change, such as a change of address or phone number.    Starting now:     Please DO NOT drink alcohol or smoke for 24 hours before surgery.  It is well known that quitting smoking can make a huge difference to your health and recovery from surgery. The longer you abstain from smoking, the better your chances of a healthy recovery. If you need help with quitting, call 1800-QUIT-NOW to be connected to a trained counselor who will discuss the best methods to help you quit.     Before your surgery:    Please stop all supplements 7 days prior to surgery. Or as directed by your surgeon.   Please stop taking NSAID pain medicine such as Advil and Motrin 7 days before surgery.    If you develop any fever, cough, cold, rashes, cuts, scratches, scrapes, urinary symptoms or infection anywhere on your body (including teeth and gums) prior to surgery, please call your surgeon’s office as soon as possible. This may require treatment to reduce the chance of cancellation on the day of surgery.    The day before your surgery:   DIET- Please follow the diet instructions at the top of your packet.   Get a good night’s rest.  Use the special soap for bathing if you have been instructed to use one.    Scheduled surgery times may change and you will be notified if this occurs - please check your personal voicemail for any updates.     On the morning of surgery:   Wear comfortable, loose fitting clothes which open in the front. Please do not wear moisturizers, creams, lotions, makeup or perfume.    Please bring with you to surgery:   Photo ID and insurance card   Current list of medicines and allergies   Pacemaker/ Defibrillator/Heart stent cards   CPAP machine and mask    Slings/ splints/ crutches   A copy of your complete advanced directive/DHPOA.    Please do NOT bring with you to surgery:   All jewelry and valuables should be left at home.   Prosthetic devices such as contact lenses, hearing aids, dentures, eyelash extensions, hairpins and body piercings must be removed prior to going in to the surgical suite.    After outpatient surgery:   A responsible adult MUST  accompany you at the time of discharge and stay with you for 24 hours after your surgery. You may NOT drive yourself home after surgery.    Do not drive, operate machinery, make critical decisions or do activities that require co-ordination or balance until after a night’s sleep.   Do not drink alcoholic beverages for 24 hours.   Instructions for resuming your medications will be provided by your surgeon.    CALL YOUR DOCTOR AFTER SURGERY IF YOU HAVE:     Chills and/or a fever of 101° F or higher.    Redness, swelling, pus or drainage from your surgical wound or a bad smell from the wound.    Lightheadedness, fainting or confusion.    Persistent vomiting (throwing up) and are not able to eat or drink for 12 hours.    Three or more loose, watery bowel movements in 24 hours (diarrhea).   Difficulty or pain while urinating( after non-urological surgery)    Pain and swelling in your legs, especially if it is only on one side.    Difficulty breathing or are breathing faster than normal.    Any new concerning symptoms.

## 2025-05-07 LAB
ATRIAL RATE: 159 BPM
Q ONSET: 227 MS
QRS COUNT: 15 BEATS
QRS DURATION: 76 MS
QT INTERVAL: 354 MS
QTC CALCULATION(BAZETT): 440 MS
QTC FREDERICIA: 410 MS
R AXIS: -12 DEGREES
T AXIS: -9 DEGREES
T OFFSET: 404 MS
VENTRICULAR RATE: 93 BPM

## 2025-05-19 ENCOUNTER — ANESTHESIA (OUTPATIENT)
Dept: OPERATING ROOM | Facility: HOSPITAL | Age: 85
End: 2025-05-19
Payer: MEDICARE

## 2025-05-19 ENCOUNTER — APPOINTMENT (OUTPATIENT)
Dept: RADIOLOGY | Facility: HOSPITAL | Age: 85
End: 2025-05-19
Payer: MEDICARE

## 2025-05-19 ENCOUNTER — APPOINTMENT (OUTPATIENT)
Dept: CARDIOLOGY | Facility: HOSPITAL | Age: 85
End: 2025-05-19
Payer: MEDICARE

## 2025-05-19 ENCOUNTER — ANESTHESIA EVENT (OUTPATIENT)
Dept: OPERATING ROOM | Facility: HOSPITAL | Age: 85
End: 2025-05-19
Payer: MEDICARE

## 2025-05-19 ENCOUNTER — HOSPITAL ENCOUNTER (OUTPATIENT)
Facility: HOSPITAL | Age: 85
Setting detail: OUTPATIENT SURGERY
Discharge: HOME | End: 2025-05-19
Attending: SURGERY | Admitting: SURGERY
Payer: MEDICARE

## 2025-05-19 ENCOUNTER — HOSPITAL ENCOUNTER (EMERGENCY)
Facility: HOSPITAL | Age: 85
Discharge: HOME | End: 2025-05-19
Attending: EMERGENCY MEDICINE
Payer: MEDICARE

## 2025-05-19 VITALS
HEART RATE: 91 BPM | WEIGHT: 199 LBS | BODY MASS INDEX: 31.23 KG/M2 | DIASTOLIC BLOOD PRESSURE: 77 MMHG | RESPIRATION RATE: 18 BRPM | HEIGHT: 67 IN | SYSTOLIC BLOOD PRESSURE: 128 MMHG | OXYGEN SATURATION: 94 % | TEMPERATURE: 97.7 F

## 2025-05-19 VITALS
RESPIRATION RATE: 14 BRPM | DIASTOLIC BLOOD PRESSURE: 94 MMHG | WEIGHT: 206 LBS | HEIGHT: 67 IN | BODY MASS INDEX: 32.33 KG/M2 | TEMPERATURE: 97 F | OXYGEN SATURATION: 98 % | HEART RATE: 82 BPM | SYSTOLIC BLOOD PRESSURE: 140 MMHG

## 2025-05-19 DIAGNOSIS — N40.1 BENIGN PROSTATIC HYPERPLASIA WITH INCOMPLETE BLADDER EMPTYING: Primary | ICD-10-CM

## 2025-05-19 DIAGNOSIS — R31.0 GROSS HEMATURIA: Primary | ICD-10-CM

## 2025-05-19 DIAGNOSIS — R39.14 BENIGN PROSTATIC HYPERPLASIA WITH INCOMPLETE BLADDER EMPTYING: Primary | ICD-10-CM

## 2025-05-19 PROBLEM — R94.31 ABNORMAL EKG: Status: ACTIVE | Noted: 2025-05-19

## 2025-05-19 LAB
ALBUMIN SERPL BCP-MCNC: 4.3 G/DL (ref 3.4–5)
ALP SERPL-CCNC: 46 U/L (ref 33–136)
ALT SERPL W P-5'-P-CCNC: 24 U/L (ref 10–52)
ANION GAP SERPL CALCULATED.3IONS-SCNC: 13 MMOL/L (ref 10–20)
APPEARANCE UR: ABNORMAL
AST SERPL W P-5'-P-CCNC: 21 U/L (ref 9–39)
BASOPHILS # BLD AUTO: 0.04 X10*3/UL (ref 0–0.1)
BASOPHILS NFR BLD AUTO: 0.3 %
BILIRUB SERPL-MCNC: 1.1 MG/DL (ref 0–1.2)
BILIRUB UR STRIP.AUTO-MCNC: NEGATIVE MG/DL
BUN SERPL-MCNC: 21 MG/DL (ref 6–23)
CALCIUM SERPL-MCNC: 9.1 MG/DL (ref 8.6–10.3)
CARDIAC TROPONIN I PNL SERPL HS: 4 NG/L (ref 0–20)
CHLORIDE SERPL-SCNC: 102 MMOL/L (ref 98–107)
CO2 SERPL-SCNC: 24 MMOL/L (ref 21–32)
COLOR UR: ABNORMAL
CREAT SERPL-MCNC: 0.89 MG/DL (ref 0.5–1.3)
EGFRCR SERPLBLD CKD-EPI 2021: 85 ML/MIN/1.73M*2
EOSINOPHIL # BLD AUTO: 0.01 X10*3/UL (ref 0–0.4)
EOSINOPHIL NFR BLD AUTO: 0.1 %
ERYTHROCYTE [DISTWIDTH] IN BLOOD BY AUTOMATED COUNT: 12.4 % (ref 11.5–14.5)
GLUCOSE SERPL-MCNC: 201 MG/DL (ref 74–99)
GLUCOSE UR STRIP.AUTO-MCNC: NORMAL MG/DL
HCT VFR BLD AUTO: 47.3 % (ref 41–52)
HGB BLD-MCNC: 16.2 G/DL (ref 13.5–17.5)
IMM GRANULOCYTES # BLD AUTO: 0.05 X10*3/UL (ref 0–0.5)
IMM GRANULOCYTES NFR BLD AUTO: 0.4 % (ref 0–0.9)
KETONES UR STRIP.AUTO-MCNC: NEGATIVE MG/DL
LACTATE SERPL-SCNC: 2 MMOL/L (ref 0.4–2)
LEUKOCYTE ESTERASE UR QL STRIP.AUTO: ABNORMAL
LYMPHOCYTES # BLD AUTO: 1.2 X10*3/UL (ref 0.8–3)
LYMPHOCYTES NFR BLD AUTO: 8.5 %
MAGNESIUM SERPL-MCNC: 1.97 MG/DL (ref 1.6–2.4)
MCH RBC QN AUTO: 31.3 PG (ref 26–34)
MCHC RBC AUTO-ENTMCNC: 34.2 G/DL (ref 32–36)
MCV RBC AUTO: 92 FL (ref 80–100)
MONOCYTES # BLD AUTO: 0.45 X10*3/UL (ref 0.05–0.8)
MONOCYTES NFR BLD AUTO: 3.2 %
MUCOUS THREADS #/AREA URNS AUTO: ABNORMAL /LPF
NEUTROPHILS # BLD AUTO: 12.29 X10*3/UL (ref 1.6–5.5)
NEUTROPHILS NFR BLD AUTO: 87.5 %
NITRITE UR QL STRIP.AUTO: NEGATIVE
NRBC BLD-RTO: 0 /100 WBCS (ref 0–0)
PH UR STRIP.AUTO: 6.5 [PH]
PHOSPHATE SERPL-MCNC: 2.7 MG/DL (ref 2.5–4.9)
PLATELET # BLD AUTO: 152 X10*3/UL (ref 150–450)
POTASSIUM SERPL-SCNC: 4.2 MMOL/L (ref 3.5–5.3)
PROT SERPL-MCNC: 6.7 G/DL (ref 6.4–8.2)
PROT UR STRIP.AUTO-MCNC: ABNORMAL MG/DL
RBC # BLD AUTO: 5.17 X10*6/UL (ref 4.5–5.9)
RBC # UR STRIP.AUTO: ABNORMAL MG/DL
RBC #/AREA URNS AUTO: >20 /HPF
SODIUM SERPL-SCNC: 135 MMOL/L (ref 136–145)
SP GR UR STRIP.AUTO: 1.02
UROBILINOGEN UR STRIP.AUTO-MCNC: NORMAL MG/DL
WBC # BLD AUTO: 14 X10*3/UL (ref 4.4–11.3)
WBC #/AREA URNS AUTO: ABNORMAL /HPF

## 2025-05-19 PROCEDURE — 84100 ASSAY OF PHOSPHORUS: CPT | Performed by: EMERGENCY MEDICINE

## 2025-05-19 PROCEDURE — 7100000002 HC RECOVERY ROOM TIME - EACH INCREMENTAL 1 MINUTE: Performed by: SURGERY

## 2025-05-19 PROCEDURE — 87086 URINE CULTURE/COLONY COUNT: CPT | Mod: TRILAB | Performed by: EMERGENCY MEDICINE

## 2025-05-19 PROCEDURE — 96360 HYDRATION IV INFUSION INIT: CPT

## 2025-05-19 PROCEDURE — 80053 COMPREHEN METABOLIC PANEL: CPT | Performed by: EMERGENCY MEDICINE

## 2025-05-19 PROCEDURE — 85025 COMPLETE CBC W/AUTO DIFF WBC: CPT | Performed by: EMERGENCY MEDICINE

## 2025-05-19 PROCEDURE — A52648 PR LASER VAPORIZATION SURGERY PROSTATE, COMPLETE: Performed by: ANESTHESIOLOGY

## 2025-05-19 PROCEDURE — 2500000004 HC RX 250 GENERAL PHARMACY W/ HCPCS (ALT 636 FOR OP/ED): Mod: JZ | Performed by: INTERNAL MEDICINE

## 2025-05-19 PROCEDURE — 52648 LASER SURGERY OF PROSTATE: CPT | Performed by: SURGERY

## 2025-05-19 PROCEDURE — 2500000004 HC RX 250 GENERAL PHARMACY W/ HCPCS (ALT 636 FOR OP/ED): Mod: JZ | Performed by: SURGERY

## 2025-05-19 PROCEDURE — 2500000004 HC RX 250 GENERAL PHARMACY W/ HCPCS (ALT 636 FOR OP/ED): Mod: JZ | Performed by: EMERGENCY MEDICINE

## 2025-05-19 PROCEDURE — 3600000005 HC OR TIME - INITIAL BASE CHARGE - PROCEDURE LEVEL FIVE: Performed by: SURGERY

## 2025-05-19 PROCEDURE — 36415 COLL VENOUS BLD VENIPUNCTURE: CPT | Performed by: EMERGENCY MEDICINE

## 2025-05-19 PROCEDURE — 7100000009 HC PHASE TWO TIME - INITIAL BASE CHARGE: Performed by: SURGERY

## 2025-05-19 PROCEDURE — 81001 URINALYSIS AUTO W/SCOPE: CPT | Performed by: EMERGENCY MEDICINE

## 2025-05-19 PROCEDURE — 83735 ASSAY OF MAGNESIUM: CPT | Performed by: EMERGENCY MEDICINE

## 2025-05-19 PROCEDURE — 93005 ELECTROCARDIOGRAM TRACING: CPT

## 2025-05-19 PROCEDURE — 7100000010 HC PHASE TWO TIME - EACH INCREMENTAL 1 MINUTE: Performed by: SURGERY

## 2025-05-19 PROCEDURE — 83605 ASSAY OF LACTIC ACID: CPT | Performed by: EMERGENCY MEDICINE

## 2025-05-19 PROCEDURE — 7100000001 HC RECOVERY ROOM TIME - INITIAL BASE CHARGE: Performed by: SURGERY

## 2025-05-19 PROCEDURE — 99100 ANES PT EXTEME AGE<1 YR&>70: CPT | Performed by: ANESTHESIOLOGY

## 2025-05-19 PROCEDURE — 3700000002 HC GENERAL ANESTHESIA TIME - EACH INCREMENTAL 1 MINUTE: Performed by: SURGERY

## 2025-05-19 PROCEDURE — A52648 PR LASER VAPORIZATION SURGERY PROSTATE, COMPLETE: Performed by: INTERNAL MEDICINE

## 2025-05-19 PROCEDURE — 99285 EMERGENCY DEPT VISIT HI MDM: CPT | Mod: 25 | Performed by: EMERGENCY MEDICINE

## 2025-05-19 PROCEDURE — 3700000001 HC GENERAL ANESTHESIA TIME - INITIAL BASE CHARGE: Performed by: SURGERY

## 2025-05-19 PROCEDURE — 74176 CT ABD & PELVIS W/O CONTRAST: CPT

## 2025-05-19 PROCEDURE — 51798 US URINE CAPACITY MEASURE: CPT

## 2025-05-19 PROCEDURE — 3600000010 HC OR TIME - EACH INCREMENTAL 1 MINUTE - PROCEDURE LEVEL FIVE: Performed by: SURGERY

## 2025-05-19 PROCEDURE — 84484 ASSAY OF TROPONIN QUANT: CPT | Performed by: EMERGENCY MEDICINE

## 2025-05-19 RX ORDER — HYDROMORPHONE HYDROCHLORIDE 0.2 MG/ML
0.1 INJECTION INTRAMUSCULAR; INTRAVENOUS; SUBCUTANEOUS EVERY 5 MIN PRN
Status: DISCONTINUED | OUTPATIENT
Start: 2025-05-19 | End: 2025-05-19 | Stop reason: HOSPADM

## 2025-05-19 RX ORDER — LIDOCAINE HYDROCHLORIDE 10 MG/ML
INJECTION, SOLUTION EPIDURAL; INFILTRATION; INTRACAUDAL; PERINEURAL AS NEEDED
Status: DISCONTINUED | OUTPATIENT
Start: 2025-05-19 | End: 2025-05-19

## 2025-05-19 RX ORDER — SODIUM CHLORIDE, SODIUM LACTATE, POTASSIUM CHLORIDE, CALCIUM CHLORIDE 600; 310; 30; 20 MG/100ML; MG/100ML; MG/100ML; MG/100ML
50 INJECTION, SOLUTION INTRAVENOUS CONTINUOUS
Status: DISCONTINUED | OUTPATIENT
Start: 2025-05-19 | End: 2025-05-19 | Stop reason: HOSPADM

## 2025-05-19 RX ORDER — PHENYLEPHRINE HCL IN 0.9% NACL 1 MG/10 ML
SYRINGE (ML) INTRAVENOUS AS NEEDED
Status: DISCONTINUED | OUTPATIENT
Start: 2025-05-19 | End: 2025-05-19

## 2025-05-19 RX ORDER — ONDANSETRON HYDROCHLORIDE 2 MG/ML
4 INJECTION, SOLUTION INTRAVENOUS ONCE AS NEEDED
Status: DISCONTINUED | OUTPATIENT
Start: 2025-05-19 | End: 2025-05-19 | Stop reason: HOSPADM

## 2025-05-19 RX ORDER — LIDOCAINE HYDROCHLORIDE 10 MG/ML
0.1 INJECTION, SOLUTION EPIDURAL; INFILTRATION; INTRACAUDAL; PERINEURAL ONCE
Status: DISCONTINUED | OUTPATIENT
Start: 2025-05-19 | End: 2025-05-19 | Stop reason: HOSPADM

## 2025-05-19 RX ORDER — ONDANSETRON HYDROCHLORIDE 2 MG/ML
INJECTION, SOLUTION INTRAVENOUS AS NEEDED
Status: DISCONTINUED | OUTPATIENT
Start: 2025-05-19 | End: 2025-05-19

## 2025-05-19 RX ORDER — ALBUTEROL SULFATE 0.83 MG/ML
2.5 SOLUTION RESPIRATORY (INHALATION) ONCE AS NEEDED
Status: DISCONTINUED | OUTPATIENT
Start: 2025-05-19 | End: 2025-05-19 | Stop reason: HOSPADM

## 2025-05-19 RX ORDER — SODIUM CHLORIDE, SODIUM LACTATE, POTASSIUM CHLORIDE, CALCIUM CHLORIDE 600; 310; 30; 20 MG/100ML; MG/100ML; MG/100ML; MG/100ML
100 INJECTION, SOLUTION INTRAVENOUS CONTINUOUS
Status: DISCONTINUED | OUTPATIENT
Start: 2025-05-19 | End: 2025-05-19 | Stop reason: HOSPADM

## 2025-05-19 RX ORDER — HYDRALAZINE HYDROCHLORIDE 20 MG/ML
5 INJECTION INTRAMUSCULAR; INTRAVENOUS EVERY 30 MIN PRN
Status: DISCONTINUED | OUTPATIENT
Start: 2025-05-19 | End: 2025-05-19 | Stop reason: HOSPADM

## 2025-05-19 RX ORDER — PROPOFOL 10 MG/ML
INJECTION, EMULSION INTRAVENOUS AS NEEDED
Status: DISCONTINUED | OUTPATIENT
Start: 2025-05-19 | End: 2025-05-19

## 2025-05-19 RX ORDER — METOPROLOL TARTRATE 1 MG/ML
INJECTION, SOLUTION INTRAVENOUS AS NEEDED
Status: DISCONTINUED | OUTPATIENT
Start: 2025-05-19 | End: 2025-05-19

## 2025-05-19 RX ORDER — FENTANYL CITRATE 50 UG/ML
INJECTION, SOLUTION INTRAMUSCULAR; INTRAVENOUS AS NEEDED
Status: DISCONTINUED | OUTPATIENT
Start: 2025-05-19 | End: 2025-05-19

## 2025-05-19 RX ORDER — HYDROMORPHONE HYDROCHLORIDE 0.2 MG/ML
0.2 INJECTION INTRAMUSCULAR; INTRAVENOUS; SUBCUTANEOUS EVERY 5 MIN PRN
Status: DISCONTINUED | OUTPATIENT
Start: 2025-05-19 | End: 2025-05-19 | Stop reason: HOSPADM

## 2025-05-19 RX ORDER — LEVOFLOXACIN 5 MG/ML
500 INJECTION, SOLUTION INTRAVENOUS ONCE
Status: COMPLETED | OUTPATIENT
Start: 2025-05-19 | End: 2025-05-19

## 2025-05-19 RX ADMIN — FENTANYL CITRATE 50 MCG: 50 INJECTION, SOLUTION INTRAMUSCULAR; INTRAVENOUS at 13:57

## 2025-05-19 RX ADMIN — LEVOFLOXACIN 500 MG: 5 INJECTION, SOLUTION INTRAVENOUS at 14:01

## 2025-05-19 RX ADMIN — PROPOFOL 200 MG: 10 INJECTION, EMULSION INTRAVENOUS at 13:57

## 2025-05-19 RX ADMIN — LEVOFLOXACIN 500 MG: 5 INJECTION, SOLUTION INTRAVENOUS at 14:07

## 2025-05-19 RX ADMIN — Medication 200 MCG: at 14:18

## 2025-05-19 RX ADMIN — METOPROLOL TARTRATE 2.5 MG: 1 INJECTION, SOLUTION INTRAVENOUS at 14:03

## 2025-05-19 RX ADMIN — LIDOCAINE HYDROCHLORIDE 5 ML: 10 INJECTION, SOLUTION EPIDURAL; INFILTRATION; INTRACAUDAL; PERINEURAL at 13:57

## 2025-05-19 RX ADMIN — DEXAMETHASONE SODIUM PHOSPHATE 4 MG: 4 INJECTION, SOLUTION INTRAMUSCULAR; INTRAVENOUS at 14:07

## 2025-05-19 RX ADMIN — ONDANSETRON 4 MG: 2 INJECTION, SOLUTION INTRAMUSCULAR; INTRAVENOUS at 14:07

## 2025-05-19 RX ADMIN — SODIUM CHLORIDE, POTASSIUM CHLORIDE, SODIUM LACTATE AND CALCIUM CHLORIDE: 600; 310; 30; 20 INJECTION, SOLUTION INTRAVENOUS at 13:53

## 2025-05-19 RX ADMIN — SODIUM CHLORIDE 1000 ML: 900 INJECTION, SOLUTION INTRAVENOUS at 21:09

## 2025-05-19 RX ADMIN — METOPROLOL TARTRATE 2.5 MG: 1 INJECTION, SOLUTION INTRAVENOUS at 14:09

## 2025-05-19 RX ADMIN — FENTANYL CITRATE 50 MCG: 50 INJECTION, SOLUTION INTRAMUSCULAR; INTRAVENOUS at 14:03

## 2025-05-19 SDOH — HEALTH STABILITY: MENTAL HEALTH: CURRENT SMOKER: 0

## 2025-05-19 ASSESSMENT — PAIN SCALES - GENERAL
PAINLEVEL_OUTOF10: 0 - NO PAIN
PAINLEVEL_OUTOF10: 8

## 2025-05-19 ASSESSMENT — PAIN - FUNCTIONAL ASSESSMENT
PAIN_FUNCTIONAL_ASSESSMENT: WONG-BAKER FACES
PAIN_FUNCTIONAL_ASSESSMENT: 0-10

## 2025-05-19 ASSESSMENT — COLUMBIA-SUICIDE SEVERITY RATING SCALE - C-SSRS

## 2025-05-19 ASSESSMENT — PAIN DESCRIPTION - DESCRIPTORS: DESCRIPTORS: BURNING

## 2025-05-19 ASSESSMENT — PAIN DESCRIPTION - FREQUENCY: FREQUENCY: CONSTANT/CONTINUOUS

## 2025-05-19 ASSESSMENT — PAIN DESCRIPTION - LOCATION: LOCATION: GROIN

## 2025-05-19 ASSESSMENT — PAIN SCALES - WONG BAKER: WONGBAKER_NUMERICALRESPONSE: NO HURT

## 2025-05-19 NOTE — ANESTHESIA PROCEDURE NOTES
Airway  Date/Time: 5/19/2025 2:00 PM  Reason: elective    Airway not difficult    Staffing  Performed: CRNA   Authorized by: Paul Graff MD    Performed by: Lm Diaz, APRN-CNP, APRN-CRNA  Patient location during procedure: OR    Patient Condition  Indications for airway management: anesthesia  Patient position: sniffing  Sedation level: deep     Final Airway Details   Preoxygenated: yes  Final airway type: supraglottic airway  Successful airway: air-Q  Size: 4  Number of attempts at approach: 1

## 2025-05-19 NOTE — OP NOTE
ABLATION, PROSTATE, TRANSURETHRAL Operative Note     Date: 2025  OR Location: [facility] OR    Name: Skip Selby, : 1940, Age: 84 y.o., MRN: [id number], Sex: male    Diagnosis  Pre-op Diagnosis      * Benign prostatic hyperplasia with incomplete bladder emptying [N40.1, R39.14] Post-op Diagnosis     * Benign prostatic hyperplasia with incomplete bladder emptying [N40.1, R39.14]     Procedures  ABLATION, PROSTATE, TRANSURETHRAL  20296 - [place] LASER VAPORIZATION OF PROSTATE FOR URINE FLOW      Surgeons      * Agus Adam - Primary    Resident/Fellow/Other Assistant:  Surgeons and Role:  * No surgeons found with a matching role *    Staff:   Circulator: Chiqui  Scrub Person: Yady Anderson Scrub: Bob Anderson Circulator: Radha    Anesthesia Staff: Anesthesiologist: Paul Graff MD  CRNA: Lm Diaz, APRN-CNP, APRN-CRNA    Procedure Summary  Anesthesia: General  ASA: III  Estimated Blood Loss: 25 mL  Intra-op Medications:   Administrations occurring from 1345 to 1505 on 25:   Medication Name Total Dose   dexAMETHasone (Decadron) 4 mg/mL IV Syringe 2 mL 4 mg   fentaNYL (Sublimaze) injection 50 mcg/mL 100 mcg   LR bolus Cannot be calculated   lidocaine PF (Xylocaine-MPF) local injection 1 % 5 mL   metoprolol tartrate (Lopressor) injection 5 mg   ondansetron (Zofran) 2 mg/mL injection 4 mg   phenylephrine 100 mcg/mL syringe 10 mL (prefilled) 200 mcg   propofol (Diprivan) injection 10 mg/mL 200 mg   levoFLOXacin (Levaquin) 500 mg in dextrose 5%  mL 500 mg              Anesthesia Record               Intraprocedure I/O Totals          Intake    LR bolus 500.00 mL    levoFLOXacin (Levaquin) 500 mg in dextrose 5%  mL 100.00 mL    Total Intake 600 mL          Specimen: No specimens collected              Drains and/or Catheters:   Urethral Catheter 20 Fr. (Active)       Tourniquet Times:         Implants:     Findings: 1. Trilobar prostatic hypertrophy.     Indications: Skip Selby is an 84  y.o. male who is having surgery for Benign prostatic hyperplasia with incomplete bladder emptying [N40.1, R39.14].     The patient was seen in the preoperative area. The risks, benefits, complications, treatment options, non-operative alternatives, expected recovery and outcomes were discussed with the patient. The possibilities of reaction to medication, pulmonary aspiration, injury to surrounding structures, bleeding, recurrent infection, the need for additional procedures, failure to diagnose a condition, and creating a complication requiring transfusion or operation were discussed with the patient. The patient concurred with the proposed plan, giving informed consent.  The site of surgery was properly noted/marked if necessary per policy. The patient has been actively warmed in preoperative area. Preoperative antibiotics have been ordered and given within 1 hours of incision. Venous thrombosis prophylaxis have been ordered including bilateral sequential compression devices    Procedure Details: The patient was brought to the operating room table.  General anesthesia was induced.  The patient was placed in the dorsolithotomy position.  His genitalia were prepped and draped in usual sterile fashion.  We introduced a cystoscope into the urethra and we entered the bladder.  We briefly inspected the bladder mucosa.  There were no abnormalities noted.  We next examined the prostate.  There was evidence of moderate to severe trilobar hypertrophy of the prostate.  We next introduced our greenlight laser fiber.  We began our laser vaporization.  We first started at the median lobe of the prostate.  We used a power setting of 80 W.  We were able to vaporize the median lobe down towards the bladder neck.  We then increased our setting to 140 W.  We began to vaporize the lateral lobes of the prostate.  Starting at the bladder neck we vaporized the obstructive tissue out towards the prostate capsule.  We made our way towards  the verumontanum.  Once we were satisfied with the left lateral lobe we then turned our attention to the right lateral lobe.  Starting at the bladder neck we vaporized the obstructive tissue.  We made our way towards the verumontanum.  A significant amount of tissue was vaporized.  We used a total of 94,000 J of energy in order to vaporize the prostate.  We did encounter some bleeding within the lateral lobes.  This was carefully controlled using coagulation.  At the end we visualized an open anterior channel.  Once we had good hemostasis we then removed our scope.  At the end we placed a 20 Estonian Steele catheter per urethra and we drained the bladder.  The patient was awakened and brought to the recovery room in stable condition.  Evidence of Infection: No   Complications:  None; patient tolerated the procedure well.    Disposition: PACU - hemodynamically stable.  Condition: stable                 Additional Details:     Attending Attestation: I was present and scrubbed for the entire procedure.    Agus Adam  Phone Number: [phone]

## 2025-05-19 NOTE — ANESTHESIA PROCEDURE NOTES
Peripheral IV  Date/Time: 5/19/2025 2:11 PM      Placement  Needle size: 20 G  Laterality: left  Location: hand  Local anesthetic: none  Site prep: alcohol  Technique: anatomical landmarks  Attempts: 1

## 2025-05-19 NOTE — ANESTHESIA POSTPROCEDURE EVALUATION
Patient: Skip Selby    Procedure Summary       Date: 05/19/25 Room / Location: [facility] OR 01 / [facility]    Anesthesia Start: 1353 Anesthesia Stop: 1436    Procedure: ABLATION, PROSTATE, TRANSURETHRAL Diagnosis:       Benign prostatic hyperplasia with incomplete bladder emptying      (Benign prostatic hyperplasia with incomplete bladder emptying [N40.1, R39.14])    Surgeons: Agus Adam MD Responsible Provider: Paul Graff MD    Anesthesia Type: general ASA Status: 3            Anesthesia Type: general    Vitals Value Taken Time   /73 05/19/25 14:50   Temp 36.2 °C (97.2 °F) 05/19/25 14:35   Pulse 85 05/19/25 14:50   Resp 16 05/19/25 14:50   SpO2 94 % 05/19/25 14:50       Anesthesia Post Evaluation    Patient location during evaluation: PACU  Patient participation: complete - patient participated  Level of consciousness: awake and alert  Pain management: adequate  Airway patency: patent  Cardiovascular status: acceptable  Respiratory status: acceptable  Hydration status: acceptable  Postoperative Nausea and Vomiting: none        There were no known notable events for this encounter.

## 2025-05-19 NOTE — ANESTHESIA PREPROCEDURE EVALUATION
Patient: Skip Selby    Procedure Information       Date/Time: 05/19/25 2018    Procedure: ABLATION, PROSTATE, TRANSURETHRAL ( Green light )    Location: [facility] / [facility]    Surgeons: Agus Adam MD        Medical History[1]     Relevant Problems   Cardiac   (+) Abnormal EKG   (+) Benign essential hypertension   (+) Coronary artery disease involving native coronary artery of native heart without angina pectoris   (+) Paroxysmal SVT (supraventricular tachycardia)   (+) Paroxysmal atrial fibrillation (Multi)   (+) Pure hypercholesterolemia      /Renal   (+) Benign prostatic hyperplasia with lower urinary tract symptoms      Musculoskeletal   (+) DDD (degenerative disc disease), lumbar   (+) Osteoarthritis of hip   (+) Osteoarthritis of hips, bilateral      HEENT   (+) Asymmetrical sensorineural hearing loss     Surgical History[2]   Clinical information reviewed:   Tobacco  Allergies  Meds   Med Hx  Surg Hx   Fam Hx          NPO Detail:  No data recorded     Physical Exam    Airway  Mallampati: III  TM distance: >3 FB  Neck ROM: full     Cardiovascular    Dental    Pulmonary    Abdominal            Anesthesia Plan    History of general anesthesia?: yes  History of complications of general anesthesia?: no    ASA 3     general     The patient is not a current smoker.  Patient was not previously instructed to abstain from smoking on day of procedure.  Patient did not smoke on day of procedure.    intravenous induction   Postoperative pain plan includes opioids.  Anesthetic plan and risks discussed with patient.    Plan discussed with attending.           [1]   Past Medical History:  Diagnosis Date    Atrial fibrillation (Multi)     Coronary artery disease     HL (hearing loss)     Hyperlipidemia     Hypertension     Multiple rib fractures involving first rib 12/25/2023    Other specified health status     No known health problems    Overactive bladder    [2]   Past Surgical History:  Procedure  Laterality Date    CATARACT EXTRACTION Right     CHOLECYSTECTOMY      OTHER SURGICAL HISTORY  02/26/2021    Gallbladder surgery    SKIN BIOPSY      TONSILLECTOMY

## 2025-05-20 ENCOUNTER — OFFICE VISIT (OUTPATIENT)
Dept: UROLOGY | Facility: CLINIC | Age: 85
End: 2025-05-20
Payer: MEDICARE

## 2025-05-20 DIAGNOSIS — N40.1 BENIGN PROSTATIC HYPERPLASIA WITH INCOMPLETE BLADDER EMPTYING: Primary | ICD-10-CM

## 2025-05-20 DIAGNOSIS — R39.14 BENIGN PROSTATIC HYPERPLASIA WITH INCOMPLETE BLADDER EMPTYING: Primary | ICD-10-CM

## 2025-05-20 DIAGNOSIS — I48.0 PAROXYSMAL ATRIAL FIBRILLATION (MULTI): ICD-10-CM

## 2025-05-20 LAB
Q ONSET: 226 MS
QRS COUNT: 15 BEATS
QRS DURATION: 74 MS
QT INTERVAL: 294 MS
QTC CALCULATION(BAZETT): 357 MS
QTC FREDERICIA: 335 MS
R AXIS: 210 DEGREES
T AXIS: 257 DEGREES
T OFFSET: 373 MS
VENTRICULAR RATE: 89 BPM

## 2025-05-20 PROCEDURE — 1160F RVW MEDS BY RX/DR IN RCRD: CPT | Performed by: SURGERY

## 2025-05-20 PROCEDURE — 1036F TOBACCO NON-USER: CPT | Performed by: SURGERY

## 2025-05-20 PROCEDURE — 1159F MED LIST DOCD IN RCRD: CPT | Performed by: SURGERY

## 2025-05-20 PROCEDURE — 99024 POSTOP FOLLOW-UP VISIT: CPT | Performed by: SURGERY

## 2025-05-20 PROCEDURE — 1157F ADVNC CARE PLAN IN RCRD: CPT | Performed by: SURGERY

## 2025-05-20 NOTE — ED PROVIDER NOTES
Patient endorsed to me by the previous physician.  Follow-up CT.  CT shows findings consistent with his recent procedure that he had done today otherwise no acute findings.  Laboratory studies vital signs stable.  The patient feels better.  Discussed the finding with the patient at bedside.  He has an appointment with his urologist tomorrow.  He is stable for discharge.     Elvis Alexis,   05/19/25 2674

## 2025-05-20 NOTE — ED PROVIDER NOTES
Emergency Department Provider Note        MEDICAL DECISION MAKING:  Medical Decision Making       5/19/25     Chief Complaint   Patient presents with    catheter     Presents to ed with a c/c of catheter problems. Had prostate ablation today, states he has had hematuria since catheter was placed today. Pain in penis and feels lightheaded. Is on eliquis.       History/Exam limitations: none.   Additional history was obtained from patient.    HPI: Skip Selby  is a 84 y.o. presents with hematuria  Past medical records, Past medical history and surgical history reviewed and as documented.  History reviewed and as noted. past medical records reviewed.    Active Ambulatory Problems     Diagnosis Date Noted    Benign essential hypertension 01/16/2024    Benign prostatic hyperplasia with lower urinary tract symptoms 10/01/2012    Cervical spondylosis 01/16/2024    Coronary artery disease involving native coronary artery of native heart without angina pectoris 01/16/2024    DDD (degenerative disc disease), lumbar 01/16/2024    Dupuytren's disease of palm of both hands 01/16/2024    Hx of adenomatous colonic polyps 10/01/2012    Pure hypercholesterolemia 01/16/2024    Osteoarthritis of hips, bilateral 01/16/2024    Paroxysmal atrial fibrillation (Multi) 01/16/2024    Paroxysmal SVT (supraventricular tachycardia) (CMS-HCC) 08/12/2024    Asymmetrical sensorineural hearing loss 09/11/2024    Osteoarthritis of hip 09/11/2024    Abnormal EKG 05/19/2025     Resolved Ambulatory Problems     Diagnosis Date Noted    Multiple rib fractures involving first rib 12/25/2023    Impacted cerumen 09/11/2024     Past Medical History:   Diagnosis Date    Atrial fibrillation (Multi)     Coronary artery disease     HL (hearing loss)     Hyperlipidemia     Hypertension     Other specified health status     Overactive bladder         Surgical History[1]     Family History[2]     Social History[3]     RX Allergies[4]     Unless otherwise  "stated in this report the patient's positive and negative responses for review of systems for constitutional, eyes, ENT, cardiovascular, respiratory, gastrointestinal, neurological, genitourinary, musculoskeletal, and integument systems and related systems to the presenting problem are either as stated in the HPI or were not pertinent or were negative for the symptoms and/or complaints related to the presenting medical problem.    PHYSICAL EXAM  Triage/nursing notes and vital signs reviewed as available and as noted    Vitals:    05/19/25 1959 05/19/25 2021   BP: 150/83    BP Location: Right arm    Patient Position: Sitting    Pulse: 84    Resp:  16   Temp: 36.5 °C (97.7 °F)    TempSrc: Tympanic    SpO2: 98%    Weight: 90.3 kg (199 lb)    Height: 1.702 m (5' 7\")            Constitutional:       Appearance: Patient not ill-appearing or toxic-appearing.   HENT:      Head: Atraumatic.      Mouth/Throat:      Mouth: Mucous membranes are moist.      Pharynx: Oropharynx is clear. No pharyngeal swelling.      Neck: No Obvious JVD. Trachea midline. No neck swelling.  Eyes:      Normal Ocular tracking  Cardiovascular:      Rate and Rhythm: Normal rate and regular rhythm.      Pulses: Normal pulses.      Heart sounds: No murmur heard.  Pulmonary:      Effort: Pulmonary effort is normal.      Breath sounds: Normal breath sounds.   Abdominal:      General: Bowel sounds are normal.      Palpations: Abdomen is soft.      Tenderness: There is no abdominal tenderness. There is no guarding or rebound.   Musculoskeletal:      Cervical back: Neck supple.      Right lower leg: No tenderness. No edema.      Left lower leg: No tenderness. No edema.   Skin:     General: Skin is warm and dry.      Capillary Refill: Capillary refill takes less than 2 seconds.   Neurological:      General: No focal deficit present.      Mental Status: Patient is alert.  Appropriate conversant     Sensory: Gross Sensation is intact.      Motor: Gross Motor " function is intact symmetrically  Psychiatric:         Mood and Affect: Mood normal.      Cooperative, no apparent risk to self or others    ED COURSE        Work-up performed to evaluate for differential diagnosis as clinically indicated   Orders Placed This Encounter   Procedures    Urine Culture    Urinalysis with Reflex Culture and Microscopic    Urinalysis with Reflex Culture and Microscopic    Extra Urine Gray Tube    CBC and Auto Differential    Comprehensive metabolic panel    Magnesium    Phosphorus    Lactate    Troponin I, High Sensitivity    Urinalysis with Reflex Culture and Microscopic    Urinalysis with Reflex Culture and Microscopic    Extra Urine Gray Tube    Microscopic Only, Urine    Vital Signs    Bladder scan    ECG 12 lead    Insert and maintain peripheral IV          Labs and imaging reviewed by me  and note         Labs Reviewed   URINALYSIS WITH REFLEX CULTURE AND MICROSCOPIC - Abnormal       Result Value    Color, Urine Dark-Brown (*)     Appearance, Urine Ex.Turbid (*)     Specific Gravity, Urine 1.017      pH, Urine 6.5      Protein, Urine 100 (2+) (*)     Glucose, Urine Normal      Blood, Urine OVER (3+) (*)     Ketones, Urine NEGATIVE      Bilirubin, Urine NEGATIVE      Urobilinogen, Urine Normal      Nitrite, Urine NEGATIVE      Leukocyte Esterase, Urine 75 Javid/uL (*)     Narrative:     OVER is reported when the result is greater than the clinically reportable range.   MICROSCOPIC ONLY, URINE - Abnormal    WBC, Urine 6-10 (*)     RBC, Urine >20 (*)     Mucus, Urine 2+     URINE CULTURE   URINALYSIS WITH REFLEX CULTURE AND MICROSCOPIC    Narrative:     The following orders were created for panel order Urinalysis with Reflex Culture and Microscopic.  Procedure                               Abnormality         Status                     ---------                               -----------         ------                     Urinalysis with Reflex C...[[id number]]  Abnormal            Final  result               Extra Urine Gray Tube[[id number]]                                                         Please view results for these tests on the individual orders.   EXTRA URINE GRAY TUBE   CBC WITH AUTO DIFFERENTIAL   COMPREHENSIVE METABOLIC PANEL   MAGNESIUM   PHOSPHORUS   LACTATE   TROPONIN I, HIGH SENSITIVITY   URINALYSIS WITH REFLEX CULTURE AND MICROSCOPIC    Narrative:     The following orders were created for panel order Urinalysis with Reflex Culture and Microscopic.  Procedure                               Abnormality         Status                     ---------                               -----------         ------                     Urinalysis with Reflex C...[[id number]]                                                 Extra Urine Gray Tube[[id number]]                                                         Please view results for these tests on the individual orders.   URINALYSIS WITH REFLEX CULTURE AND MICROSCOPIC   EXTRA URINE GRAY TUBE        CT abdomen pelvis wo IV contrast    (Results Pending)            Pt course which Intervention and treatment included :    Procedure  Procedures    Medications   sodium chloride 0.9 % bolus 1,000 mL (has no administration in time range)        ED Course as of 05/19/25 2219   Mon May 19, 2025   2049 There are lightheadedness twelve-lead EKG performed.  Twelve-lead EKG notes atrial fibrillation 89 bpm.  Other intervals normal.  No ST elevations or depressions. [ML]   2147 WBC(!): 14.0 [ML]   2219 Given gross hematuria, and patient on Eliquis will obtain CT imaging for further evaluation identification of possible bleeding source.  Urinalysis still pending.  Patient signed out to oncoming provider in stable condition to follow-up with CT results and discussed with urology [ML]      ED Course User Index  [ML] Marty R Lejeune, DO         Diagnoses as of 05/19/25 2219   Gross hematuria          DISPOSITION: Signed out to oncoming provider in stable condition  pending CT results and urology consultation    1. Gross hematuria           -------------------------------------------------------------------    25 at 8:38 PM - Marty R LeJeune, DO   Internal & Emergency Medicine              [1]   Past Surgical History:  Procedure Laterality Date    CATARACT EXTRACTION Right     CHOLECYSTECTOMY      OTHER SURGICAL HISTORY  2021    Gallbladder surgery    SKIN BIOPSY      TONSILLECTOMY     [2]   Family History  Problem Relation Name Age of Onset    Alzheimer's disease Father      Alzheimer's disease Sister      Other (fall) Sister     [3]   Social History  Tobacco Use    Smoking status: Former     Current packs/day: 0.00     Average packs/day: 0.5 packs/day for 10.0 years (5.0 ttl pk-yrs)     Types: Cigarettes     Start date:      Quit date:      Years since quittin.4    Smokeless tobacco: Never   Vaping Use    Vaping status: Never Used   Substance Use Topics    Alcohol use: Not Currently     Comment: denies    Drug use: Never   [4]   Allergies  Allergen Reactions    Tamsulosin Other     Had chest pains    Penicillins Headache and Unknown     headache        Marty R Lejeune, DO  Resident  [phone]

## 2025-05-20 NOTE — DISCHARGE INSTRUCTIONS
Thank you for allowing us to care for you today.  You may receive a survey regarding your visit today.    If you were satisfied with the care and service provided we would be very grateful if you would give us a high rating.    Your feedback is very important to us.    Dr. Alexis        As of today's visit, based on reasonable likelihood, that it is safe for you to be discharged back to your residence to follow-up as an outpatient for ongoing management of your medical problem. You should follow-up with any referrals / primary provider as soon as possible. The contacts (number, addresses) are listed below.         Important:  Even though we think it is safe for you to go home, there is always a small chance that we are missing something that could require hospitalization.  Therefore it is very important that if you get worse or develops any new symptoms that you return here as soon as possible to be re-evaluated.  This includes return of symptoms that have resolved such as fainting, chest pain, or symptoms that could be warning signs for stroke important:  Even though we think it is safe for you to go home, there is always a small chance that we are missing something that could require hospitalization.  Therefore it is very important that if you get worse or develops any new symptoms that you return here as soon as possible to be re-evaluated.  This includes return of symptoms that have resolved such as fainting, chest pain, or symptoms that could be warning signs for stroke         Make sure your pharmacy and primary doctor is aware of any new medications prescribed today.          It is your responsibility to contact as soon as possible, and follow through with, any referrals you were given today. We do recommend you inform them you are a [facility] follow-up patient, as often they can better accommodate your need to be seen, provided their schedules allow. We will, and have, made every effort to ensure you have  access to adequate follow-up specialists available.          All problems may not be able to be fixed in one ER visit. This is why timely ongoing care is important, and this is a responsibility you share in. Further, you are free to follow up with any provider you choose, and this is not limited to our suggestion.          If cultures were obtained today, you will be contacted should anything result that would require further treatment. Please contact the ED at the number provided with questions.          Having trouble affording medications? Try [email or web address]! (This is not a hospital endorsed website, merely a recommendation based on my own personal experiences with [facility])

## 2025-05-20 NOTE — PROGRESS NOTES
Subjective   Patient ID: Skip Selby is a 84 y.o. male who presents for Cath removal.    HPI  He is here for a postop visit.  Yesterday he underwent a laser vaporization of his prostate.  He did end up in the ER yesterday evening.  He had a near syncope.  Eventually was discharged home with a negative workup.  He feels well today.  He still has hematuria.                Objective   Physical Exam  Well nourished  Obese, soft, ND, NT                Assessment/Plan   Problem List Items Addressed This Visit           ICD-10-CM       Genitourinary and Reproductive    Benign prostatic hyperplasia with lower urinary tract symptoms - Primary N40.1          He is status post laser PVP.  We will remove his catheter for a voiding trial today.  He will monitor his voiding.  I instructed him to resume his Eliquis on Monday.  He will return and see me in 1 month.          Agus Adam MD 05/20/25 12:59 PM

## 2025-05-21 ASSESSMENT — PAIN SCALES - GENERAL: PAINLEVEL_OUTOF10: 0 - NO PAIN

## 2025-05-22 LAB — BACTERIA UR CULT: NO GROWTH

## 2025-05-30 ENCOUNTER — APPOINTMENT (OUTPATIENT)
Dept: AUDIOLOGY | Facility: CLINIC | Age: 85
End: 2025-05-30
Payer: MEDICARE

## 2025-05-30 DIAGNOSIS — H90.3 SENSORINEURAL HEARING LOSS (SNHL) OF BOTH EARS: Primary | ICD-10-CM

## 2025-05-30 PROCEDURE — 92557 COMPREHENSIVE HEARING TEST: CPT | Performed by: AUDIOLOGIST

## 2025-05-30 PROCEDURE — 92567 TYMPANOMETRY: CPT | Performed by: AUDIOLOGIST

## 2025-05-30 PROCEDURE — HRANC PR HEARING AID NO CHARGE: Performed by: AUDIOLOGIST

## 2025-05-30 NOTE — PROGRESS NOTES
AUDIOLOGY ADULT AUDIOMETRIC EVALUATION    Name:  Skip Selby  :  1940  Age:  84 y.o.  Date of Evaluation:  May 30, 2025    Reason for visit: Mr. Selby is seen in the clinic today for an audiologic evaluation.     HISTORY  The patient has a history of bilateral sensorineural hearing loss and wears binaural hearing aids.       EVALUATION  See scanned audiogram: “Media” > “Audiology Report”.      RESULTS  Otoscopic Evaluation:  Right Ear: clear ear canal  Left Ear: clear ear canal    Immittance Measures:  Tympanometry:  Right Ear: Type A, normal tympanic membrane mobility with normal middle ear pressure   Left Ear: Type A, normal tympanic membrane mobility with normal middle ear pressure     Acoustic Reflexes:  Ipsilateral Right Ear: did not evaluate   Ipsilateral Left Ear: did not evaluate   Contralateral Right Ear: did not evaluate  Contralateral Left Ear: did not evaluate    Distortion Product Otoacoustic Emissions (DPOAEs):  Right Ear: did not evaluate   Left Ear: did not evaluate     Audiometry:  Test Technique and Reliability:   Standard audiometry via supra-aural headphones. Reliability is good.    Pure tone air and bone conduction audiometry:  Right Ear: mild sloping to moderately-severe sensorineural hearing loss   Left Ear: mild sloping to severe sensorineural hearing loss     Speech Audiometry (Word Recognition Scores):   Right Ear: Excellent, 96% in quiet at an elevated presentation level   Left Ear: Good, 88% in quiet at an elevated presentation level     IMPRESSIONS  Results of today's audiometric evaluation revealed a bilateral sensorineural hearing loss.  Results are essentially stable compared with the previous hearing evaluation from May 8, 2024.  Results of tympanometry testing indicated normal middle ear function in both ears.     RECOMMENDATIONS  - Annual audiologic evaluation, sooner if an acute change is noted.  - Continued hearing aid use.  - Follow-up with audiology annually for  routine hearing aid maintenance, sooner if questions/problems arise.    PATIENT EDUCATION  Discussed results, impressions and recommendations with the patient. Questions were addressed and the patient was encouraged to contact our office should concerns arise.    Time for this encounter: 2:30-3:00    Meron Basilio M.A., CCC-A   Licensed Audiologist

## 2025-05-30 NOTE — PROGRESS NOTES
HEARING AID CHECK- ZACK HEARING     RIGHT: ZACK HEARING (SIGNIA) 6 Li ADVANCED RECHARGEABLE IN-THE-CANAL AID   S.N.: [id number]  LEFT:  ZACK HEARING (SIGNIA) 6 Li ADVANCED RECHARGEABLE IN-THE-CANAL AID   S.N.: [id number]  WARRANTY EXPIRES: 6/13/2027  QUICK GUARD WAX GUARDS     The patient is being seen today for his annual hearing aid clean and check.  Otoscopy revealed clear ear canals bilaterally.  Tested his hearing and noted that it is stable compared to his previous audiogram.  Cleaned and dehumidified both hearing aids and vacuumed the microphones.  There was no wax guard in the aids.  None of the wax guards we had here seemed to fit.  The patient has some at home and will put them in when he gets home.  He has some difficulty understanding his wife.  Raised the gain from 3146-4626 Hz in both hearing aids.  This is the patient's last no charge visit.  Recall in one year or sooner if needed.      Appointment time: 3:00-3:40

## 2025-06-24 ENCOUNTER — APPOINTMENT (OUTPATIENT)
Dept: UROLOGY | Facility: CLINIC | Age: 85
End: 2025-06-24
Payer: MEDICARE

## 2025-06-24 DIAGNOSIS — R39.11 BENIGN PROSTATIC HYPERPLASIA WITH URINARY HESITANCY: Primary | ICD-10-CM

## 2025-06-24 DIAGNOSIS — N40.1 BENIGN PROSTATIC HYPERPLASIA WITH URINARY HESITANCY: Primary | ICD-10-CM

## 2025-06-24 PROCEDURE — 1160F RVW MEDS BY RX/DR IN RCRD: CPT | Performed by: SURGERY

## 2025-06-24 PROCEDURE — 99024 POSTOP FOLLOW-UP VISIT: CPT | Performed by: SURGERY

## 2025-06-24 PROCEDURE — 1126F AMNT PAIN NOTED NONE PRSNT: CPT | Performed by: SURGERY

## 2025-06-24 PROCEDURE — 1036F TOBACCO NON-USER: CPT | Performed by: SURGERY

## 2025-06-24 PROCEDURE — 1159F MED LIST DOCD IN RCRD: CPT | Performed by: SURGERY

## 2025-06-24 ASSESSMENT — PAIN SCALES - GENERAL: PAINLEVEL_OUTOF10: 0-NO PAIN

## 2025-06-24 NOTE — PROGRESS NOTES
Subjective   Patient ID: Skip Selby is a 84 y.o. male who presents for Follow-up.    HPI  He has known BPH with LUTS.  Last month he had a greenlight laser vaporization of the prostate.  Since his surgery has done remarkably well.  His voiding has improved.  He feels he is emptying well.  His hematuria has resolved.  Overall he is satisfied.              Objective   Physical Exam  Well nourished  Abdomen soft, ND, NT                Assessment/Plan   Problem List Items Addressed This Visit           ICD-10-CM       Genitourinary and Reproductive    Benign prostatic hyperplasia with lower urinary tract symptoms - Primary N40.1    Relevant Orders    Follow Up In Urology        He is status post laser vaporization of the prostate.  He has significant improvement in his symptoms.  He will return and see me in 6 months.          Agus Adam MD 06/24/25 1:13 PM

## 2025-09-17 ENCOUNTER — APPOINTMENT (OUTPATIENT)
Dept: PRIMARY CARE | Facility: CLINIC | Age: 85
End: 2025-09-17
Payer: MEDICARE

## 2025-09-29 ENCOUNTER — APPOINTMENT (OUTPATIENT)
Dept: DERMATOLOGY | Facility: CLINIC | Age: 85
End: 2025-09-29
Payer: MEDICARE

## 2025-12-30 ENCOUNTER — APPOINTMENT (OUTPATIENT)
Dept: UROLOGY | Facility: CLINIC | Age: 85
End: 2025-12-30
Payer: MEDICARE

## (undated) DEVICE — GOWNS, SURGICAL, NONREINFORCED, W/ RAGLAN SLEEVE, SZ XL

## (undated) DEVICE — GLOVE, PROTEXIS PI CLASSIC, SZ-7.5, PF, LF

## (undated) DEVICE — CATHETER, URETHRAL, FOLEY, 2 WAY, BARDEX LUBRICATH, SHORT LENGTH, 20 FR, 5 CC, LATEX

## (undated) DEVICE — Device

## (undated) DEVICE — BLANKET, LOWER BODY, VHA PLUS, ADULT

## (undated) DEVICE — PREP TRAY, SKIN

## (undated) DEVICE — BAG, DRAINAGE, ANTI-REFLUX CHAMBER, 2000ML